# Patient Record
Sex: FEMALE | Race: WHITE
[De-identification: names, ages, dates, MRNs, and addresses within clinical notes are randomized per-mention and may not be internally consistent; named-entity substitution may affect disease eponyms.]

---

## 2022-03-11 ENCOUNTER — HOSPITAL ENCOUNTER (INPATIENT)
Dept: HOSPITAL 61 - ER | Age: 77
LOS: 2 days | Discharge: HOME | DRG: 291 | End: 2022-03-13
Attending: INTERNAL MEDICINE | Admitting: INTERNAL MEDICINE
Payer: MEDICARE

## 2022-03-11 VITALS — HEIGHT: 63 IN | BODY MASS INDEX: 47.66 KG/M2 | WEIGHT: 268.96 LBS

## 2022-03-11 DIAGNOSIS — I48.91: ICD-10-CM

## 2022-03-11 DIAGNOSIS — Z87.891: ICD-10-CM

## 2022-03-11 DIAGNOSIS — R79.89: ICD-10-CM

## 2022-03-11 DIAGNOSIS — E66.01: ICD-10-CM

## 2022-03-11 DIAGNOSIS — J44.9: ICD-10-CM

## 2022-03-11 DIAGNOSIS — E11.42: ICD-10-CM

## 2022-03-11 DIAGNOSIS — F32.A: ICD-10-CM

## 2022-03-11 DIAGNOSIS — Z90.710: ICD-10-CM

## 2022-03-11 DIAGNOSIS — I11.0: Primary | ICD-10-CM

## 2022-03-11 DIAGNOSIS — F41.9: ICD-10-CM

## 2022-03-11 DIAGNOSIS — U07.1: ICD-10-CM

## 2022-03-11 DIAGNOSIS — K21.9: ICD-10-CM

## 2022-03-11 DIAGNOSIS — Z96.659: ICD-10-CM

## 2022-03-11 DIAGNOSIS — M54.50: ICD-10-CM

## 2022-03-11 DIAGNOSIS — I50.31: ICD-10-CM

## 2022-03-11 DIAGNOSIS — Z82.49: ICD-10-CM

## 2022-03-11 DIAGNOSIS — Z20.822: ICD-10-CM

## 2022-03-11 DIAGNOSIS — I95.9: ICD-10-CM

## 2022-03-11 DIAGNOSIS — G89.29: ICD-10-CM

## 2022-03-11 DIAGNOSIS — Z90.49: ICD-10-CM

## 2022-03-11 DIAGNOSIS — E03.9: ICD-10-CM

## 2022-03-11 DIAGNOSIS — M19.90: ICD-10-CM

## 2022-03-11 LAB
ALBUMIN SERPL-MCNC: 3 G/DL (ref 3.4–5)
ALBUMIN/GLOB SERPL: 0.6 {RATIO} (ref 1–1.7)
ALP SERPL-CCNC: 76 U/L (ref 46–116)
ALT SERPL-CCNC: 17 U/L (ref 14–59)
ANION GAP SERPL CALC-SCNC: 9 MMOL/L (ref 6–14)
APTT PPP: YELLOW S
AST SERPL-CCNC: 12 U/L (ref 15–37)
BACTERIA #/AREA URNS HPF: (no result) /HPF
BASOPHILS # BLD AUTO: 0.1 X10^3/UL (ref 0–0.2)
BASOPHILS NFR BLD: 1 % (ref 0–3)
BILIRUB SERPL-MCNC: 0.4 MG/DL (ref 0.2–1)
BILIRUB UR QL STRIP: (no result)
BUN SERPL-MCNC: 15 MG/DL (ref 7–20)
BUN/CREAT SERPL: 13 (ref 6–20)
CALCIUM SERPL-MCNC: 9.5 MG/DL (ref 8.5–10.1)
CHLORIDE SERPL-SCNC: 102 MMOL/L (ref 98–107)
CO2 SERPL-SCNC: 28 MMOL/L (ref 21–32)
CREAT SERPL-MCNC: 1.2 MG/DL (ref 0.6–1)
D DIMER PPP FEU-MCNC: 0.57 UG/MLFEU (ref 0–0.5)
EOSINOPHIL NFR BLD: 0.2 X10^3/UL (ref 0–0.7)
EOSINOPHIL NFR BLD: 2 % (ref 0–3)
ERYTHROCYTE [DISTWIDTH] IN BLOOD BY AUTOMATED COUNT: 14.4 % (ref 11.5–14.5)
FIBRINOGEN PPP-MCNC: CLEAR MG/DL
GFR SERPLBLD BASED ON 1.73 SQ M-ARVRAT: 43.9 ML/MIN
GLUCOSE SERPL-MCNC: 105 MG/DL (ref 70–99)
HCT VFR BLD CALC: 40.2 % (ref 36–47)
HGB BLD-MCNC: 13 G/DL (ref 12–15.5)
HYALINE CASTS #/AREA URNS LPF: (no result) /HPF
INFLUENZA A PATIENT: NEGATIVE
INFLUENZA B PATIENT: NEGATIVE
LIPASE: 28 U/L (ref 73–393)
LYMPHOCYTES # BLD: 3.1 X10^3/UL (ref 1–4.8)
LYMPHOCYTES NFR BLD AUTO: 32 % (ref 24–48)
MAGNESIUM SERPL-MCNC: 1.8 MG/DL (ref 1.8–2.4)
MCH RBC QN AUTO: 30 PG (ref 25–35)
MCHC RBC AUTO-ENTMCNC: 32 G/DL (ref 31–37)
MCV RBC AUTO: 93 FL (ref 79–100)
MONO #: 0.8 X10^3/UL (ref 0–1.1)
MONOCYTES NFR BLD: 8 % (ref 0–9)
NEUT #: 5.4 X10^3/UL (ref 1.8–7.7)
NEUTROPHILS NFR BLD AUTO: 57 % (ref 31–73)
NITRITE UR QL STRIP: NEGATIVE
PH UR STRIP: 5.5 [PH]
PHOSPHATE SERPL-MCNC: 2.9 MG/DL (ref 2.6–4.7)
PLATELET # BLD AUTO: 348 X10^3/UL (ref 140–400)
POTASSIUM SERPL-SCNC: 3.9 MMOL/L (ref 3.5–5.1)
PROT SERPL-MCNC: 8.1 G/DL (ref 6.4–8.2)
PROT UR STRIP-MCNC: NEGATIVE MG/DL
PROTHROMBIN TIME: 13 SEC (ref 11.7–14)
RBC # BLD AUTO: 4.34 X10^6/UL (ref 3.5–5.4)
RBC #/AREA URNS HPF: 0 /HPF (ref 0–2)
SODIUM SERPL-SCNC: 139 MMOL/L (ref 136–145)
UROBILINOGEN UR-MCNC: 0.2 MG/DL
WBC # BLD AUTO: 9.6 X10^3/UL (ref 4–11)
WBC #/AREA URNS HPF: (no result) /HPF (ref 0–4)

## 2022-03-11 PROCEDURE — 96375 TX/PRO/DX INJ NEW DRUG ADDON: CPT

## 2022-03-11 PROCEDURE — 96361 HYDRATE IV INFUSION ADD-ON: CPT

## 2022-03-11 PROCEDURE — 93005 ELECTROCARDIOGRAM TRACING: CPT

## 2022-03-11 PROCEDURE — G0378 HOSPITAL OBSERVATION PER HR: HCPCS

## 2022-03-11 PROCEDURE — 76705 ECHO EXAM OF ABDOMEN: CPT

## 2022-03-11 PROCEDURE — 83605 ASSAY OF LACTIC ACID: CPT

## 2022-03-11 PROCEDURE — 36415 COLL VENOUS BLD VENIPUNCTURE: CPT

## 2022-03-11 PROCEDURE — 83690 ASSAY OF LIPASE: CPT

## 2022-03-11 PROCEDURE — 85379 FIBRIN DEGRADATION QUANT: CPT

## 2022-03-11 PROCEDURE — 93306 TTE W/DOPPLER COMPLETE: CPT

## 2022-03-11 PROCEDURE — 71275 CT ANGIOGRAPHY CHEST: CPT

## 2022-03-11 PROCEDURE — 83735 ASSAY OF MAGNESIUM: CPT

## 2022-03-11 PROCEDURE — 74177 CT ABD & PELVIS W/CONTRAST: CPT

## 2022-03-11 PROCEDURE — 96365 THER/PROPH/DIAG IV INF INIT: CPT

## 2022-03-11 PROCEDURE — 87040 BLOOD CULTURE FOR BACTERIA: CPT

## 2022-03-11 PROCEDURE — 82962 GLUCOSE BLOOD TEST: CPT

## 2022-03-11 PROCEDURE — 83880 ASSAY OF NATRIURETIC PEPTIDE: CPT

## 2022-03-11 PROCEDURE — 87428 SARSCOV & INF VIR A&B AG IA: CPT

## 2022-03-11 PROCEDURE — 85610 PROTHROMBIN TIME: CPT

## 2022-03-11 PROCEDURE — C8929 TTE W OR WO FOL WCON,DOPPLER: HCPCS

## 2022-03-11 PROCEDURE — 80048 BASIC METABOLIC PNL TOTAL CA: CPT

## 2022-03-11 PROCEDURE — 84484 ASSAY OF TROPONIN QUANT: CPT

## 2022-03-11 PROCEDURE — 81001 URINALYSIS AUTO W/SCOPE: CPT

## 2022-03-11 PROCEDURE — 85025 COMPLETE CBC W/AUTO DIFF WBC: CPT

## 2022-03-11 PROCEDURE — 80053 COMPREHEN METABOLIC PANEL: CPT

## 2022-03-11 PROCEDURE — U0003 INFECTIOUS AGENT DETECTION BY NUCLEIC ACID (DNA OR RNA); SEVERE ACUTE RESPIRATORY SYNDROME CORONAVIRUS 2 (SARS-COV-2) (CORONAVIRUS DISEASE [COVID-19]), AMPLIFIED PROBE TECHNIQUE, MAKING USE OF HIGH THROUGHPUT TECHNOLOGIES AS DESCRIBED BY CMS-2020-01-R: HCPCS

## 2022-03-11 PROCEDURE — 84100 ASSAY OF PHOSPHORUS: CPT

## 2022-03-11 NOTE — RAD
INDICATION: Reason: right chest pain and epigastric pain, OMNI 350 80 ML IV / Spl. Instructions:  / H
istory: .  



COMPARISON: None



TECHNIQUE:



Axial CT images obtained through the chest, abdomen and pelvis with contrast. 3-dimensional angiograp
hic images processed



One or more of the following individualized dose reduction techniques were utilized for this examinat
ion:  1. Automated exposure control;  2. Adjustment of the mA and/or kV according to patient size;  3
. Use of iterative reconstruction technique.



FINDINGS:



There is some pulmonary nodules measuring up to 6 mm within the left lung. Mild opacities at left gre
ater than right lung base with a portion appearing linear.

There is also some groundglass opacities. Right lung nodules are also seen including one anteriorly m
easuring 6 mm.

Coronary artery calcific atherosclerosis.

Severe atherosclerotic disease of the thoracic aorta.

There is dilatation throughout the esophagus with intraluminal content as well as regions of wall thi
ckening. There is a mass seen within the mediastinum abutting the esophagus and airway approximately 
39 x 45 mm but a portion of this measurement includes mediastinal structures. No embolus in the main,
 right main or left main pulmonary artery with peripheral vessels obscured by motion.



Atherosclerotic disease throughout the abdominal aorta.

Degenerative changes throughout the spine with multilevel central canal and neural foraminal stenosis
.

Multiple ribs with angulation bilaterally could be from fractures of unknown age.

Postcholecystectomy changes with prominence of the bile ducts which is a common finding postoperative
ly. Liver is mildly prominent in size.

Fatty atrophy of the pancreas.

There is a suspected small duodenal diverticulum.

Spleen not grossly enlarged.

Nonobstructive left renal stone measuring 15 mm without hydronephrosis. Urinary bladder is decompress
ed.

Scoliotic curvature of the spine.

Fat-containing umbilical hernia.

Colonic diverticulosis.

No dilated loops of bowel to suggest obstruction. Degenerative changes hips.



IMPRESSION:



*  Mass is identified within the mediastinum. Differential considerations would include esophageal or
igin neoplasm or lymphadenopathy from causes such as lung origin neoplasm. The esophagus is also dist
ended with intraluminal content and has some masslike wall thickening.



*  Bilateral lung nodules are identified and are indeterminate in nature and follow-up will be needed
 to ensure no growth.



*  Mild opacities at the lower lungs including linear and groundglass component. Could be from atelec
tasis but mild infiltrate is not excluded.



*  Atherosclerotic disease throughout the vasculature.



*  No evidence of bowel obstruction.



*  Angulations of the bilateral ribs which could be from rib fractures of unknown age.



*  Severe degenerative changes of the spine with multilevel central canal and neural foraminal stenos
is.



*  Dilation of the bile ducts with some enhancement seen at the distal common bile duct. Could be sec
ondary to a stricture within the area with distal common bile duct or ampullary mass also in differen
tial.



Electronically signed by: Alexander Sabillon MD (3/11/2022 10:32 PM) [id number]

## 2022-03-11 NOTE — EKG
[facility]

              [address] [place] [address]

Test Date:    2022               Test Time:    19:04:14

Pat Name:     LUCAS RITCHIE                Department:   

Patient ID:   [id number]           Room:          

Gender:       F                        Technician:   

:          1945               Requested By: ALIX MCALLISTER

Order Number: [id number]           Reading MD:   Dionisio Hernandez

                                 Measurements

Intervals                              Axis          

Rate:         135                      P:            

MO:                                    QRS:          -5

QRSD:         90                       T:            22

QT:           288                                    

QTc:          436                                    

                           Interpretive Statements

ATRIAL FIBRILLATION WITH RVR

Electronically Signed On 3- 18:31:45 CST by Dionisio Hernandez

## 2022-03-11 NOTE — ED.ADGEN
Past Medical History


Past Medical History:  COPD, Diabetes-Type II, GERD, Hypertension, Hypothyroid, 

Other


Additional Past Medical Histor:  chronic low back pain


Past Surgical History:  Cholecystectomy, Hysterectomy, Knee Replacement, Other


Additional Past Surgical Histo:  gastric sleeve, cataracts


Smoking Status:  Former Smoker


Alcohol Use:  None


Drug Use:  None





General Adult


EDM:


Chief Complaint:  CHEST PAIN





HPI:


HPI:





Patient is a 76  year old female coming in with right-sided chest pain.  Patient

states the pain is along her anterior rib cage below her right breast and 

travels around to the back.  Patient states that she had COVID (despite being 

fully vaccinated) about 1.5 months ago and has never fully recovered.  Patient 

states that she has been having diarrhea but over the past 2 weeks has been 

vomiting.  States vomit is nonbloody nonbilious.  Has not had any fevers or 

cough.  States there is been to mucus in the emesis.  Patient has a history of a

hiatal hernia repair and gastric sleeve surgery, she is also had a 

cholecystectomy.  Patient is complaining of epigastric pain as well.  States she

is worried she has had pneumonia because she has been hospitalized for it in the

past.  No recent sick contacts, recent travel, raw or undercooked food.  Patient

is also noted to be in A. fib with RVR, patient denies any history of irregular 

heartbeat, and does not take any blood thinners.  She denies any left-sided 

chest pain.  Patient states she has seen her primary care provider 1 week ago 

and 2 weeks ago.  States she has not been getting any better.





Review of Systems:


Review of Systems:


All other systems within normal limits except for as noted in the HPI





Current Medications:





Current Medications








 Medications


  (Trade)  Dose


 Ordered  Sig/Jes  Start Time


 Stop Time Status Last Admin


Dose Admin


 


 Info


  (CONTRAST GIVEN


 -- Rx MONITORING)  1 each  PRN DAILY  PRN  3/11/22 21:15


 3/13/22 21:14   





 


 Iohexol


  (Omnipaque 350


 Mg/ml)  80 ml  1X  ONCE  3/11/22 21:30


 3/11/22 21:31 DC 3/11/22 21:22


80 ML


 


 Ondansetron HCl


  (Zofran)  4 mg  1X  ONCE  3/11/22 19:30


 3/11/22 19:37 DC 3/11/22 19:58


4 MG


 


 Sodium Chloride  1,000 ml @ 


 1,000 mls/hr  1X  ONCE  3/11/22 19:30


 3/11/22 20:29 DC 3/11/22 19:59


1,000 MLS/HR











Allergies:


Allergies:





Allergies








Coded Allergies Type Severity Reaction Last Updated Verified


 


  No Known Drug Allergies    18 No











Physical Exam:


PE:


Constitutional: Well developed, well nourished, no acute distress, non-toxic 

appearance. []


HENT: Normocephalic, atraumatic, bilateral external ears normal,  nose normal. 

[]


Eyes: PERRLA, conjunctiva normal, no discharge. [] 


Neck: No rigidity, supple, no stridor. [] 


Cardiovascular: Regular rate and rhythm, brisk cap refill.  Tachycardic, 

irregularly irregular rhythm []


Lungs & Thorax: Non labored symmetric respirations, no tachypnea or respiratory 

distress.  Lungs clear []


Abdomen: Soft, nondistended, epigastric tenderness without guarding or rebound.


Skin: Warm, dry, no erythema, no rash. [] 


Back: Unremarkable


Extremities: No deformities, range of motion grossly intact, no lower extremity 

edema [] 


Neurologic: Alert and oriented X 3, no focal deficits noted. []


Psychologic: Affect normal, judgement normal, mood normal. []





Current Patient Data:


Labs:





                                Laboratory Tests








Test


 3/11/22


19:09 3/11/22


20:08 3/11/22


20:36


 


White Blood Count


 9.6 x10^3/uL


(4.0-11.0) 


 





 


Red Blood Count


 4.34 x10^6/uL


(3.50-5.40) 


 





 


Hemoglobin


 13.0 g/dL


(12.0-15.5) 


 





 


Hematocrit


 40.2 %


(36.0-47.0) 


 





 


Mean Corpuscular Volume


 93 fL ()


 


 





 


Mean Corpuscular Hemoglobin 30 pg (25-35)    


 


Mean Corpuscular Hemoglobin


Concent 32 g/dL


(31-37) 


 





 


Red Cell Distribution Width


 14.4 %


(11.5-14.5) 


 





 


Platelet Count


 348 x10^3/uL


(140-400) 


 





 


Neutrophils (%) (Auto) 57 % (31-73)    


 


Lymphocytes (%) (Auto) 32 % (24-48)    


 


Monocytes (%) (Auto) 8 % (0-9)    


 


Eosinophils (%) (Auto) 2 % (0-3)    


 


Basophils (%) (Auto) 1 % (0-3)    


 


Neutrophils # (Auto)


 5.4 x10^3/uL


(1.8-7.7) 


 





 


Lymphocytes # (Auto)


 3.1 x10^3/uL


(1.0-4.8) 


 





 


Monocytes # (Auto)


 0.8 x10^3/uL


(0.0-1.1) 


 





 


Eosinophils # (Auto)


 0.2 x10^3/uL


(0.0-0.7) 


 





 


Basophils # (Auto)


 0.1 x10^3/uL


(0.0-0.2) 


 





 


Prothrombin Time


 13.0 SEC


(11.7-14.0) 


 





 


Prothrombin Time INR 1.0 (0.8-1.1)    


 


D-Dimer (Jennifer)


 0.57 ug/mlFEU


(0.00-0.50)  H 


 





 


Sodium Level


 139 mmol/L


(136-145) 


 





 


Potassium Level


 3.9 mmol/L


(3.5-5.1) 


 





 


Chloride Level


 102 mmol/L


() 


 





 


Carbon Dioxide Level


 28 mmol/L


(21-32) 


 





 


Anion Gap 9 (6-14)    


 


Blood Urea Nitrogen


 15 mg/dL


(7-20) 


 





 


Creatinine


 1.2 mg/dL


(0.6-1.0)  H 


 





 


Estimated GFR


(Cockcroft-Gault) 43.9  


 


 





 


BUN/Creatinine Ratio 13 (6-20)    


 


Glucose Level


 105 mg/dL


(70-99)  H 


 





 


Lactic Acid Level


 1.1 mmol/L


(0.4-2.0) 


 





 


Calcium Level


 9.5 mg/dL


(8.5-10.1) 


 





 


Phosphorus Level


 2.9 mg/dL


(2.6-4.7) 


 





 


Magnesium Level


 1.8 mg/dL


(1.8-2.4) 


 





 


Total Bilirubin


 0.4 mg/dL


(0.2-1.0) 


 





 


Aspartate Amino Transferase


(AST) 12 U/L (15-37)


L 


 





 


Alanine Aminotransferase (ALT)


 17 U/L (14-59)


 


 





 


Alkaline Phosphatase


 76 U/L


() 


 





 


Troponin I High Sensitivity 6 ng/L (4-50)    


 


NT-Pro-B-Type Natriuretic


Peptide 2070 pg/mL


(0-124)  H 


 





 


Total Protein


 8.1 g/dL


(6.4-8.2) 


 





 


Albumin


 3.0 g/dL


(3.4-5.0)  L 


 





 


Albumin/Globulin Ratio


 0.6 (1.0-1.7)


L 


 





 


Lipase


 28 U/L


()  L 


 





 


Influenza Type A Antigen


 


 Negative


(NEGATIVE) 





 


Influenza Type B Antigen


 


 Negative


(NEGATIVE) 





 


SARS-CoV-2 Antigen (Rapid)


 


 Negative


(NEGATIVE) 





 


Urine Collection Type   Unknown  


 


Urine Color   Yellow  


 


Urine Clarity   Clear  


 


Urine pH


 


 


 5.5 (<5.0-8.0)





 


Urine Specific Gravity


 


 


 1.025


(1.000-1.030)


 


Urine Protein


 


 


 Negative mg/dL


(NEG-TRACE)


 


Urine Glucose (UA)


 


 


 Negative mg/dL


(NEG)


 


Urine Ketones (Stick)


 


 


 Trace mg/dL


(NEG)


 


Urine Blood


 


 


 Negative (NEG)





 


Urine Nitrite


 


 


 Negative (NEG)





 


Urine Bilirubin   Small (NEG)  


 


Urine Urobilinogen Dipstick


 


 


 0.2 mg/dL (0.2


mg/dL)


 


Urine Leukocyte Esterase


 


 


 Negative (NEG)





 


Urine RBC   0 /HPF (0-2)  


 


Urine WBC


 


 


 1-4 /HPF (0-4)





 


Urine Squamous Epithelial


Cells 


 


 Mod /LPF  





 


Urine Bacteria


 


 


 Few /HPF


(0-FEW)


 


Urine Hyaline Casts   Few /HPF  


 


Urine Mucus   Mod /LPF  





                                Laboratory Tests


3/11/22 19:09








                                Laboratory Tests


3/11/22 19:09








Vital Signs:





                                   Vital Signs








  Date Time  Temp Pulse Resp B/P (MAP) Pulse Ox O2 Delivery O2 Flow Rate FiO2


 


3/11/22 21:32  124 24 144/60 (88) 96 Room Air  


 


3/11/22 18:57 98.0       





 98.0       











EKG:


EKG:


Irregular regular rhythm, heart rate 135 bpm, slight lax left axis deviation, no

 ST elevation or depression.  When compared to EKG dated 18 she is in 

normal sinus rhythm at that time []





Heart Score:


C/O Chest Pain:  Yes


HEART Score for Chest Pain:  








HEART Score for Chest Pain Response (Comments) Value


 


History Slighlty/Non-Suspicious 0


 


ECG Nonspecific Repolarizatio 1


 


Age > 65 2


 


Risk Factors                            1 or 2 Risk Factors 1


 


Troponin < Normal Limit 0


 


Total  4








Risk Factors:


Risk Factors:  DM, Current or recent (<one month) smoker, HTN, HLP, family 

history of CAD, obesity.


Risk Scores:


Score 0 - 3:  2.5% MACE over next 6 weeks - Discharge Home


Score 4 - 6:  20.3% MACE over next 6 weeks - Admit for Clinical Observation


Score 7 - 10:  72.7% MACE over next 6 weeks - Early Invasive Strategies





Radiology/Procedures:


Radiology/Procedures:


[facility]


                    [address] [place] [address]


                                 [phone]


                                        


                                 IMAGING REPORT





                                     Signed





PATIENT: LUCAS RITCHIE      ACCOUNT: [id number]     MRN#: [id number]


: 1945           LOCATION: ER              AGE: 76


SEX: F                    EXAM DT: 22         ACCESSION#: [id number]


STATUS: REG ER            ORD. PHYSICIAN: ALIX MCALLISTER MD


REASON: right chest pain and epigastric pain, OMNI 350 80 ML IV


PROCEDURE: CT ANGIO CHEST W ABD PEL W/





INDICATION: Reason: right chest pain and epigastric pain, OMNI 350 80 ML IV / 

Spl. Instructions:  / History: .  





COMPARISON: None





TECHNIQUE:





Axial CT images obtained through the chest, abdomen and pelvis with contrast. 3-

dimensional angiographic images processed





One or more of the following individualized dose reduction techniques were 

utilized for this examination:  1. Automated exposure control;  2. Adjustment of

 the mA and/or kV according to patient size;  3. Use of iterative reconstruction

 technique.





FINDINGS:





There is some pulmonary nodules measuring up to 6 mm within the left lung. Mild 

opacities at left greater than right lung base with a portion appearing linear.


There is also some groundglass opacities. Right lung nodules are also seen 

including one anteriorly measuring 6 mm.


Coronary artery calcific atherosclerosis.


Severe atherosclerotic disease of the thoracic aorta.


There is dilatation throughout the esophagus with intraluminal content as well 

as regions of wall thickening. There is a mass seen within the mediastinum 

abutting the esophagus and airway approximately 39 x 45 mm but a portion of this

 measurement includes mediastinal structures. No embolus in the main, right main

 or left main pulmonary artery with peripheral vessels obscured by motion.





Atherosclerotic disease throughout the abdominal aorta.


Degenerative changes throughout the spine with multilevel central canal and 

neural foraminal stenosis.


Multiple ribs with angulation bilaterally could be from fractures of unknown 

age.


Postcholecystectomy changes with prominence of the bile ducts which is a common 

finding postoperatively. Liver is mildly prominent in size.


Fatty atrophy of the pancreas.


There is a suspected small duodenal diverticulum.


Spleen not grossly enlarged.


Nonobstructive left renal stone measuring 15 mm without hydronephrosis. Urinary 

bladder is decompressed.


Scoliotic curvature of the spine.


Fat-containing umbilical hernia.


Colonic diverticulosis.


No dilated loops of bowel to suggest obstruction. Degenerative changes hips.





IMPRESSION:





*  Mass is identified within the mediastinum. Differential considerations would 

include esophageal origin neoplasm or lymphadenopathy from causes such as lung 

origin neoplasm. The esophagus is also distended with intraluminal content and 

has some masslike wall thickening.





*  Bilateral lung nodules are identified and are indeterminate in nature and 

follow-up will be needed to ensure no growth.





*  Mild opacities at the lower lungs including linear and groundglass component.

 Could be from atelectasis but mild infiltrate is not excluded.





*  Atherosclerotic disease throughout the vasculature.





*  No evidence of bowel obstruction.





*  Angulations of the bilateral ribs which could be from rib fractures of 

unknown age.





*  Severe degenerative changes of the spine with multilevel central canal and 

neural foraminal stenosis.





*  Dilation of the bile ducts with some enhancement seen at the distal common 

bile duct. Could be secondary to a stricture within the area with distal common 

bile duct or ampullary mass also in differential.





Electronically signed by: Greyson Merritt MD (3/11/2022 10:32 PM) AbaxiaKTOP-

[id number]














DICTATED and SIGNED BY:     GREYSON MERRITT MD


DATE:     22 [id number]


[][facility]


                    [address] [place] [address]


                                 [phone]


                                        


                                 IMAGING REPORT





                                     Signed





PATIENT: LUCAS RITCHIE      ACCOUNT: [id number]     MRN#: [id number]


: 1945           LOCATION: [address] [place] [address]         AGE: 76


SEX: F                    EXAM DT: 22         ACCESSION#: [id number]


STATUS: ADM IN            ORD. PHYSICIAN: ALIX MCALLISTER MD


REASON: RUQ, biliary dilation


PROCEDURE: ABDOMEN LTD





US ABDOMEN LIMITED





History: Reason: RUQ, biliary dilation / Spl. Instructions:  / History: 





Comparison: CT 2022.





Technique: Transabdominal ultrasound images are obtained of the right upper 

quadrant.





Findings:


Liver is normal in echogenicity. Right hepatic lobe measures 13.7 cm.  Portal 

flow is hepatopedal.





Prior cholecystectomy.





Common bile duct not identified due to overlying structures.





Visualized pancreas not well seen due to overlying bowel gas.  





The right kidney measures 9.7 x 3.6 x 4.3 cm. No hydronephrosis.





Aorta and IVC not well seen due to overlying structures.





IMPRESSION: 


1.  Degraded evaluation. No definite acute abdominal pathology.


2.  Common bile is not identified. Prior cholecystectomy. If persistent clinical

 concern, MRI/MRCP can further evaluate.





Electronically signed by: Alirio Rachel DO (3/12/2022 12:43 AM) [facility]














DICTATED and SIGNED BY:     ALIRIO RACHEL DO


DATE:     22 [id number]





Course & Med Decision Making:


Course & Med Decision Making


Pertinent Labs and Imaging studies reviewed. (See chart for details)


Patient in new onset A. fib with RVR.  Will admit on diltiazem for cardiology 

evaluation.


[]





Dragon Disclaimer:


Dragon Disclaimer:


This electronic medical record was generated, in whole or in part, using a voice

 recognition dictation system.





Departure


Departure


Impression:  


   Primary Impression:  


   Nausea & vomiting


   Additional Impression:  


   New onset atrial fibrillation


Disposition:   ADMITTED AS INPATIENT


Admitting Physician:  HIMS


Condition:  STABLE


Referrals:  


MEL THAKKAR DO (PCP)





Problem Qualifiers











ALIX MCALLISTER MD            Mar 11, 2022 19:41

## 2022-03-12 VITALS — SYSTOLIC BLOOD PRESSURE: 119 MMHG | DIASTOLIC BLOOD PRESSURE: 76 MMHG

## 2022-03-12 VITALS — SYSTOLIC BLOOD PRESSURE: 124 MMHG | DIASTOLIC BLOOD PRESSURE: 66 MMHG

## 2022-03-12 VITALS — DIASTOLIC BLOOD PRESSURE: 50 MMHG | SYSTOLIC BLOOD PRESSURE: 96 MMHG

## 2022-03-12 VITALS — DIASTOLIC BLOOD PRESSURE: 58 MMHG | SYSTOLIC BLOOD PRESSURE: 117 MMHG

## 2022-03-12 VITALS — DIASTOLIC BLOOD PRESSURE: 67 MMHG | SYSTOLIC BLOOD PRESSURE: 79 MMHG

## 2022-03-12 VITALS — DIASTOLIC BLOOD PRESSURE: 90 MMHG | SYSTOLIC BLOOD PRESSURE: 101 MMHG

## 2022-03-12 VITALS — DIASTOLIC BLOOD PRESSURE: 56 MMHG | SYSTOLIC BLOOD PRESSURE: 97 MMHG

## 2022-03-12 VITALS — SYSTOLIC BLOOD PRESSURE: 91 MMHG | DIASTOLIC BLOOD PRESSURE: 62 MMHG

## 2022-03-12 VITALS — SYSTOLIC BLOOD PRESSURE: 104 MMHG | DIASTOLIC BLOOD PRESSURE: 67 MMHG

## 2022-03-12 LAB
ANION GAP SERPL CALC-SCNC: 8 MMOL/L (ref 6–14)
BASOPHILS # BLD AUTO: 0 X10^3/UL (ref 0–0.2)
BASOPHILS NFR BLD: 0 % (ref 0–3)
BUN SERPL-MCNC: 15 MG/DL (ref 7–20)
CALCIUM SERPL-MCNC: 8.7 MG/DL (ref 8.5–10.1)
CHLORIDE SERPL-SCNC: 105 MMOL/L (ref 98–107)
CO2 SERPL-SCNC: 28 MMOL/L (ref 21–32)
CREAT SERPL-MCNC: 1.1 MG/DL (ref 0.6–1)
EOSINOPHIL NFR BLD: 0.1 X10^3/UL (ref 0–0.7)
EOSINOPHIL NFR BLD: 2 % (ref 0–3)
ERYTHROCYTE [DISTWIDTH] IN BLOOD BY AUTOMATED COUNT: 14 % (ref 11.5–14.5)
GFR SERPLBLD BASED ON 1.73 SQ M-ARVRAT: 48.6 ML/MIN
GLUCOSE SERPL-MCNC: 101 MG/DL (ref 70–99)
HCT VFR BLD CALC: 35.4 % (ref 36–47)
HGB BLD-MCNC: 11.5 G/DL (ref 12–15.5)
LYMPHOCYTES # BLD: 2.3 X10^3/UL (ref 1–4.8)
LYMPHOCYTES NFR BLD AUTO: 30 % (ref 24–48)
MCH RBC QN AUTO: 31 PG (ref 25–35)
MCHC RBC AUTO-ENTMCNC: 33 G/DL (ref 31–37)
MCV RBC AUTO: 94 FL (ref 79–100)
MONO #: 0.8 X10^3/UL (ref 0–1.1)
MONOCYTES NFR BLD: 10 % (ref 0–9)
NEUT #: 4.4 X10^3/UL (ref 1.8–7.7)
NEUTROPHILS NFR BLD AUTO: 58 % (ref 31–73)
PLATELET # BLD AUTO: 279 X10^3/UL (ref 140–400)
POTASSIUM SERPL-SCNC: 3.6 MMOL/L (ref 3.5–5.1)
RBC # BLD AUTO: 3.78 X10^6/UL (ref 3.5–5.4)
SODIUM SERPL-SCNC: 141 MMOL/L (ref 136–145)
WBC # BLD AUTO: 7.7 X10^3/UL (ref 4–11)

## 2022-03-12 RX ADMIN — GABAPENTIN SCH MG: 300 CAPSULE ORAL at 20:29

## 2022-03-12 RX ADMIN — APIXABAN SCH MG: 5 TABLET, FILM COATED ORAL at 20:29

## 2022-03-12 RX ADMIN — BUPROPION HYDROCHLORIDE SCH MG: 150 TABLET, FILM COATED, EXTENDED RELEASE ORAL at 20:29

## 2022-03-12 RX ADMIN — LEVOTHYROXINE SODIUM SCH MCG: 100 TABLET ORAL at 10:11

## 2022-03-12 RX ADMIN — BUPROPION HYDROCHLORIDE SCH MG: 150 TABLET, FILM COATED, EXTENDED RELEASE ORAL at 10:16

## 2022-03-12 RX ADMIN — GABAPENTIN SCH MG: 300 CAPSULE ORAL at 10:16

## 2022-03-12 RX ADMIN — APIXABAN SCH MG: 5 TABLET, FILM COATED ORAL at 14:28

## 2022-03-12 NOTE — PDOC1
History and Physical


Date of Admission


Date of Admission


DATE: 3/12/22 


TIME: 12:17





Source


Source:  Chart review, Patient





History of Present Illness


History of Present Illness


 Ms Yoon is a 76  year old female admit from ER with acute right-sided chest 

pain. 


she thought pleurisy because the pain went ot her back,  no palpitations, soem 

shortnes of breath. She was nauseated and vomited last night


Pain 6/10, better thsi AM


She had recent COVID, was vaccinated and has been weak since, with occassional 

GI symtoms. 


 admti for  A. fib with RVR,  no prior history,  feels better on cardizem gtt 

this AM





Past Medical History


Past Medical History


peripheral neuropathy


Cardiovascular:  HTN


Pulmonary:  No pertinent hx


GI:  GERD


Musculoskeletal:   low back pain, Osteoarthritis


Rheumatologic:  No pertinent hx


Endocrine:  Diabetes





Current Problem List


Problem List


Problems


Medical Problems:


(1) Nausea & vomiting


Status: Acute  





(2) New onset atrial fibrillation


Status: Acute  











Current Medications


Current Medications





Current Medications


Sodium Chloride 1,000 ml @  1,000 mls/hr 1X  ONCE IV  Last administered on 

3/11/22at 19:59;  Start 3/11/22 at 19:30;  Stop 3/11/22 at 20:29;  Status DC


Ondansetron HCl (Zofran) 4 mg 1X  ONCE IVP  Last administered on 3/11/22at 

19:58;  Start 3/11/22 at 19:30;  Stop 3/11/22 at 19:37;  Status DC


Iohexol (Omnipaque 350 Mg/ml) 80 ml 1X  ONCE IV  Last administered on 3/11/22at 

21:22;  Start 3/11/22 at 21:30;  Stop 3/11/22 at 21:31;  Status DC


Info (CONTRAST GIVEN -- Rx MONITORING) 1 each PRN DAILY  PRN MC SEE COMMENTS;  

Start 3/11/22 at 21:15;  Stop 3/13/22 at 21:14


Diltiazem HCl (Cardizem Iv Push) 10 mg 1X  ONCE IVP  Last administered on 

3/11/22at 21:59;  Start 3/11/22 at 22:00;  Stop 3/11/22 at 22:01;  Status DC


Diltiazem HCl 125 mg/Sodium Chloride 125 ml @ 5 mls/hr 1X  ONCE IV ;  Start 3/1

1/22 at 22:00;  Stop 3/11/22 at 21:48;  Status DC


Diltiazem HCl 125 mg/Dextrose 125 ml @ 5 mls/hr 1X  ONCE IV  Last administered 

on 3/11/22at 22:00;  Start 3/11/22 at 22:00;  Stop 3/12/22 at 11:26;  Status DC


Ondansetron HCl (Zofran) 4 mg PRN Q8HRS  PRN IVP NAUSEA/VOMITING 1ST CHOICE;  

Start 3/11/22 at 22:15;  Stop 3/12/22 at 22:14


Fentanyl Citrate (Fentanyl 2ml Vial) 50 mcg PRN Q1HR  PRN IVP SEVERE PAIN 7-10; 

Start 3/11/22 at 22:15;  Stop 3/12/22 at 22:14


Acetaminophen (Tylenol) 650 mg PRN Q4HRS  PRN PO FEVER > 100.3'F;  Start 3/11/22

at 22:15;  Stop 3/12/22 at 22:14


Diltiazem HCl 125 mg/Dextrose 125 ml @ 5 mls/hr CONT PRN  PRN IV AFIB Last 

administered on 3/12/22at 04:26;  Start 3/12/22 at 04:30


Bupropion HCl (Wellbutrin Sr) 150 mg BID PO  Last administered on 3/12/22at 

10:16;  Start 3/12/22 at 11:00


Gabapentin (Neurontin) 300 mg TID PO  Last administered on 3/12/22at 10:16;  

Start 3/12/22 at 12:00


Acetaminophen/ Hydrocodone Bitart (Lortab 7.5/325) 1 tab PRN Q6HRS  PRN PO PAIN 

Last administered on 3/12/22at 12:11;  Start 3/12/22 at 10:15


Levothyroxine Sodium (Synthroid) 100 mcg DAILY06 PO ;  Start 3/12/22 at 11:00





Active Scripts


Active


Reported


Metformin Hcl 1,000 Mg Tablet 1,000 Mg PO BIDWMEALS


     Metformin 1000mg tab, takes 1/2 to 2 tabs daily


Calcium (Calcium Carbonate) 500 Mg Tab.chew 600 Mg PO DAILY


Larry (Ursodiol) 250 Mg Tablet 300 Mg PO DAILY


Levothyroxine Sodium 100 Mcg Tablet 1 Tab PO DAILY


Hydrocodone-Apap 7.5-325  ** (Hydrocodone Bit/Acetaminophen) 1 Tab Tablet 1 Tab 

PO PRN Q6HRS PRN


Gemtesa (Vibegron) 75 Mg Tablet 75 Mg PO DAILY


Phentermine Hcl 37.5 Mg Capsule 1 Cap PO DAILYWBKFT


Omeprazole 20 Mg Capsule. 1 Cap PO DAILY


Hydrochlorothiazide Tablet  ** (Hydrochlorothiazide) 25 Mg Tablet 1 Tab PO DAILY


Gabapentin ** (Gabapentin) 300 Mg Capsule 300 Mg PO TID


Daily Multiple Vitamin (Multivitamin) 1 Each Tablet 1 Each PO DAILY


Wellbutrin Sr (Bupropion Hcl) 150 Mg Tablet.er 1 Tab PO BID





Allergies


Allergies:  


Coded Allergies:  


     No Known Drug Allergies (Unverified , 1/16/18)





ROS


General:  No: Chills, Night Sweats, Fatigue, Malaise, Appetite, Other


PSYCHOLOGICAL ROS:  YES: Anxiety, Sleep disturbances; 


   No: Behavioral Disorder, Concentration difficultie, Decreased libido, 

Depression, Disorientation, Hallucinations, Hostility, Irritablity, Memory 

difficulties, Mood Swings, Obsessive thoughts, Other


Eyes:  No Blurry vision, No Decreased vision, No Double vision, No Dry eyes, No 

Excessive tearing, No Eye Pain, No Itchy Eyes, No Loss of vision, No P

hotophobia, No Scotomata, No Uses contacts, No Uses glasses, No Other


HEENT:  No: Heacaches, Visual Changes, Hearing change, Nasal congestion, Nasal 

discharge, Oral lesions, Sinus pain, Sore Throat, Epistaxis, Sneezing, Snoring, 

Tinnitus, Vertigo, Vocal changes, Other


Respiratory:  YES: Pleuritic Pain; 


   No: Cough, Hemoptysis, Orthopnea, Shortness of breath, SOB with excertion, 

Sputum Changes, Stridor, Tachypnea, Wheezing, Other


Cardiovascular:  yes Chest Pain; 


   No Palpitations, No Orthopnea, No Paroxysmal Noc. Dyspnea, No Edema, No Lt 

Headedness, No Other


Gastrointestinal:  Yes Nausea; 


   No Vomiting, No Abdominal Pain, No Diarrhea, No Constipation, No Melena, No 

Hematochezia, No Other


Genitourinary:  No Dysuria, No Frequency, No Incontinence, No Hematuria, No 

Retention, No Discharge, No Urgency, No Pain, No Flank Pain, No Other, No , No ,

No , No , No , No , No 


Musculoskeletal:  Yes Joint Pain, Yes Joint Stiffness


Neurological:  No Behavorial Changes, No Bowel/Bladder ControlChng, No 

Confusion, No Dizziness, No Gait Disturbance, No Headaches, No Impaired 

Coord/balance, No Memory Loss, No Numbness/Tingling, No Seizures, No Speech 

Problems, No Tremors, No Visual Changes, No Weakness, No Other


Skin:  No Dry Skin, No Eczema, No Hair Changes, No Lumps, No Mole Changes, No 

Mottling, No Nail Changes, No Pruritus, No Rash, No Skin Lesion Changes, No 

Other, No Acne





Physical Exam


General:  Alert, Oriented X3, Cooperative, mild distress


HEENT:  Atraumatic


Lungs:  Clear to auscultation


Heart:  S1S2, RRR


Abdomen:  Soft (obese)


Extremities:  No clubbing


Skin:  No significant lesion


Neuro:  Normal speech, Normal tone, Sensation intact


Psych/Mental Status:  Mental status NL, Mood NL





Vitals


Vitals





Vital Signs








  Date Time  Temp Pulse Resp B/P (MAP) Pulse Ox O2 Delivery O2 Flow Rate FiO2


 


3/12/22 11:00 98.1 80 18 97/56 (70)  Room Air  





 98.1       


 


3/12/22 07:00     94   


 


3/12/22 00:25       3.0 











Labs


Labs





Laboratory Tests








Test


 3/11/22


19:09 3/11/22


20:08 3/11/22


20:36 3/12/22


04:00


 


White Blood Count


 9.6 x10^3/uL


(4.0-11.0) 


 


 7.7 x10^3/uL


(4.0-11.0)


 


Red Blood Count


 4.34 x10^6/uL


(3.50-5.40) 


 


 3.78 x10^6/uL


(3.50-5.40)


 


Hemoglobin


 13.0 g/dL


(12.0-15.5) 


 


 11.5 g/dL


(12.0-15.5)


 


Hematocrit


 40.2 %


(36.0-47.0) 


 


 35.4 %


(36.0-47.0)


 


Mean Corpuscular Volume 93 fL ()    94 fL () 


 


Mean Corpuscular Hemoglobin 30 pg (25-35)    31 pg (25-35) 


 


Mean Corpuscular Hemoglobin


Concent 32 g/dL


(31-37) 


 


 33 g/dL


(31-37)


 


Red Cell Distribution Width


 14.4 %


(11.5-14.5) 


 


 14.0 %


(11.5-14.5)


 


Platelet Count


 348 x10^3/uL


(140-400) 


 


 279 x10^3/uL


(140-400)


 


Neutrophils (%) (Auto) 57 % (31-73)    58 % (31-73) 


 


Lymphocytes (%) (Auto) 32 % (24-48)    30 % (24-48) 


 


Monocytes (%) (Auto) 8 % (0-9)    10 % (0-9) 


 


Eosinophils (%) (Auto) 2 % (0-3)    2 % (0-3) 


 


Basophils (%) (Auto) 1 % (0-3)    0 % (0-3) 


 


Neutrophils # (Auto)


 5.4 x10^3/uL


(1.8-7.7) 


 


 4.4 x10^3/uL


(1.8-7.7)


 


Lymphocytes # (Auto)


 3.1 x10^3/uL


(1.0-4.8) 


 


 2.3 x10^3/uL


(1.0-4.8)


 


Monocytes # (Auto)


 0.8 x10^3/uL


(0.0-1.1) 


 


 0.8 x10^3/uL


(0.0-1.1)


 


Eosinophils # (Auto)


 0.2 x10^3/uL


(0.0-0.7) 


 


 0.1 x10^3/uL


(0.0-0.7)


 


Basophils # (Auto)


 0.1 x10^3/uL


(0.0-0.2) 


 


 0.0 x10^3/uL


(0.0-0.2)


 


Prothrombin Time


 13.0 SEC


(11.7-14.0) 


 


 





 


Prothromb Time International


Ratio 1.0 (0.8-1.1) 


 


 


 





 


D-Dimer (Jennifer)


 0.57 ug/mlFEU


(0.00-0.50) 


 


 





 


Sodium Level


 139 mmol/L


(136-145) 


 


 141 mmol/L


(136-145)


 


Potassium Level


 3.9 mmol/L


(3.5-5.1) 


 


 3.6 mmol/L


(3.5-5.1)


 


Chloride Level


 102 mmol/L


() 


 


 105 mmol/L


()


 


Carbon Dioxide Level


 28 mmol/L


(21-32) 


 


 28 mmol/L


(21-32)


 


Anion Gap 9 (6-14)    8 (6-14) 


 


Blood Urea Nitrogen


 15 mg/dL


(7-20) 


 


 15 mg/dL


(7-20)


 


Creatinine


 1.2 mg/dL


(0.6-1.0) 


 


 1.1 mg/dL


(0.6-1.0)


 


Estimated GFR


(Cockcroft-Gault) 43.9 


 


 


 48.6 





 


BUN/Creatinine Ratio 13 (6-20)    


 


Glucose Level


 105 mg/dL


(70-99) 


 


 101 mg/dL


(70-99)


 


Lactic Acid Level


 1.1 mmol/L


(0.4-2.0) 


 


 





 


Calcium Level


 9.5 mg/dL


(8.5-10.1) 


 


 8.7 mg/dL


(8.5-10.1)


 


Phosphorus Level


 2.9 mg/dL


(2.6-4.7) 


 


 





 


Magnesium Level


 1.8 mg/dL


(1.8-2.4) 


 


 





 


Total Bilirubin


 0.4 mg/dL


(0.2-1.0) 


 


 





 


Aspartate Amino Transf


(AST/SGOT) 12 U/L (15-37) 


 


 


 





 


Alanine Aminotransferase


(ALT/SGPT) 17 U/L (14-59) 


 


 


 





 


Alkaline Phosphatase


 76 U/L


() 


 


 





 


Troponin I High Sensitivity 6 ng/L (4-50)    


 


NT-Pro-B-Type Natriuretic


Peptide 2070 pg/mL


(0-124) 


 


 





 


Total Protein


 8.1 g/dL


(6.4-8.2) 


 


 





 


Albumin


 3.0 g/dL


(3.4-5.0) 


 


 





 


Albumin/Globulin Ratio 0.6 (1.0-1.7)    


 


Lipase


 28 U/L


() 


 


 





 


Influenza Type A Antigen


 


 Negative


(NEGATIVE) 


 





 


Influenza Type B Antigen


 


 Negative


(NEGATIVE) 


 





 


SARS-CoV-2 Antigen (Rapid)


 


 Negative


(NEGATIVE) 


 





 


Urine Collection Type   Unknown  


 


Urine Color   Yellow  


 


Urine Clarity   Clear  


 


Urine pH   5.5 (<5.0-8.0)  


 


Urine Specific Gravity


 


 


 1.025


(1.000-1.030) 





 


Urine Protein


 


 


 Negative mg/dL


(NEG-TRACE) 





 


Urine Glucose (UA)


 


 


 Negative mg/dL


(NEG) 





 


Urine Ketones (Stick)


 


 


 Trace mg/dL


(NEG) 





 


Urine Blood   Negative (NEG)  


 


Urine Nitrite   Negative (NEG)  


 


Urine Bilirubin   Small (NEG)  


 


Urine Urobilinogen Dipstick


 


 


 0.2 mg/dL (0.2


mg/dL) 





 


Urine Leukocyte Esterase   Negative (NEG)  


 


Urine RBC   0 /HPF (0-2)  


 


Urine WBC   1-4 /HPF (0-4)  


 


Urine Squamous Epithelial


Cells 


 


 Mod /LPF 


 





 


Urine Bacteria


 


 


 Few /HPF


(0-FEW) 





 


Urine Hyaline Casts   Few /HPF  


 


Urine Mucus   Mod /LPF  


 


Test


 3/12/22


07:48 3/12/22


11:27 


 





 


Glucose (Fingerstick)


 93 mg/dL


(70-99) 106 mg/dL


(70-99) 


 











Laboratory Tests








Test


 3/11/22


19:09 3/11/22


20:08 3/11/22


20:36 3/12/22


04:00


 


White Blood Count


 9.6 x10^3/uL


(4.0-11.0) 


 


 7.7 x10^3/uL


(4.0-11.0)


 


Red Blood Count


 4.34 x10^6/uL


(3.50-5.40) 


 


 3.78 x10^6/uL


(3.50-5.40)


 


Hemoglobin


 13.0 g/dL


(12.0-15.5) 


 


 11.5 g/dL


(12.0-15.5)


 


Hematocrit


 40.2 %


(36.0-47.0) 


 


 35.4 %


(36.0-47.0)


 


Mean Corpuscular Volume 93 fL ()    94 fL () 


 


Mean Corpuscular Hemoglobin 30 pg (25-35)    31 pg (25-35) 


 


Mean Corpuscular Hemoglobin


Concent 32 g/dL


(31-37) 


 


 33 g/dL


(31-37)


 


Red Cell Distribution Width


 14.4 %


(11.5-14.5) 


 


 14.0 %


(11.5-14.5)


 


Platelet Count


 348 x10^3/uL


(140-400) 


 


 279 x10^3/uL


(140-400)


 


Neutrophils (%) (Auto) 57 % (31-73)    58 % (31-73) 


 


Lymphocytes (%) (Auto) 32 % (24-48)    30 % (24-48) 


 


Monocytes (%) (Auto) 8 % (0-9)    10 % (0-9) 


 


Eosinophils (%) (Auto) 2 % (0-3)    2 % (0-3) 


 


Basophils (%) (Auto) 1 % (0-3)    0 % (0-3) 


 


Neutrophils # (Auto)


 5.4 x10^3/uL


(1.8-7.7) 


 


 4.4 x10^3/uL


(1.8-7.7)


 


Lymphocytes # (Auto)


 3.1 x10^3/uL


(1.0-4.8) 


 


 2.3 x10^3/uL


(1.0-4.8)


 


Monocytes # (Auto)


 0.8 x10^3/uL


(0.0-1.1) 


 


 0.8 x10^3/uL


(0.0-1.1)


 


Eosinophils # (Auto)


 0.2 x10^3/uL


(0.0-0.7) 


 


 0.1 x10^3/uL


(0.0-0.7)


 


Basophils # (Auto)


 0.1 x10^3/uL


(0.0-0.2) 


 


 0.0 x10^3/uL


(0.0-0.2)


 


Prothrombin Time


 13.0 SEC


(11.7-14.0) 


 


 





 


Prothromb Time International


Ratio 1.0 (0.8-1.1) 


 


 


 





 


D-Dimer (Jennifer)


 0.57 ug/mlFEU


(0.00-0.50) 


 


 





 


Sodium Level


 139 mmol/L


(136-145) 


 


 141 mmol/L


(136-145)


 


Potassium Level


 3.9 mmol/L


(3.5-5.1) 


 


 3.6 mmol/L


(3.5-5.1)


 


Chloride Level


 102 mmol/L


() 


 


 105 mmol/L


()


 


Carbon Dioxide Level


 28 mmol/L


(21-32) 


 


 28 mmol/L


(21-32)


 


Anion Gap 9 (6-14)    8 (6-14) 


 


Blood Urea Nitrogen


 15 mg/dL


(7-20) 


 


 15 mg/dL


(7-20)


 


Creatinine


 1.2 mg/dL


(0.6-1.0) 


 


 1.1 mg/dL


(0.6-1.0)


 


Estimated GFR


(Cockcroft-Gault) 43.9 


 


 


 48.6 





 


BUN/Creatinine Ratio 13 (6-20)    


 


Glucose Level


 105 mg/dL


(70-99) 


 


 101 mg/dL


(70-99)


 


Lactic Acid Level


 1.1 mmol/L


(0.4-2.0) 


 


 





 


Calcium Level


 9.5 mg/dL


(8.5-10.1) 


 


 8.7 mg/dL


(8.5-10.1)


 


Phosphorus Level


 2.9 mg/dL


(2.6-4.7) 


 


 





 


Magnesium Level


 1.8 mg/dL


(1.8-2.4) 


 


 





 


Total Bilirubin


 0.4 mg/dL


(0.2-1.0) 


 


 





 


Aspartate Amino Transf


(AST/SGOT) 12 U/L (15-37) 


 


 


 





 


Alanine Aminotransferase


(ALT/SGPT) 17 U/L (14-59) 


 


 


 





 


Alkaline Phosphatase


 76 U/L


() 


 


 





 


Troponin I High Sensitivity 6 ng/L (4-50)    


 


NT-Pro-B-Type Natriuretic


Peptide 2070 pg/mL


(0-124) 


 


 





 


Total Protein


 8.1 g/dL


(6.4-8.2) 


 


 





 


Albumin


 3.0 g/dL


(3.4-5.0) 


 


 





 


Albumin/Globulin Ratio 0.6 (1.0-1.7)    


 


Lipase


 28 U/L


() 


 


 





 


Influenza Type A Antigen


 


 Negative


(NEGATIVE) 


 





 


Influenza Type B Antigen


 


 Negative


(NEGATIVE) 


 





 


SARS-CoV-2 Antigen (Rapid)


 


 Negative


(NEGATIVE) 


 





 


Urine Collection Type   Unknown  


 


Urine Color   Yellow  


 


Urine Clarity   Clear  


 


Urine pH   5.5 (<5.0-8.0)  


 


Urine Specific Gravity


 


 


 1.025


(1.000-1.030) 





 


Urine Protein


 


 


 Negative mg/dL


(NEG-TRACE) 





 


Urine Glucose (UA)


 


 


 Negative mg/dL


(NEG) 





 


Urine Ketones (Stick)


 


 


 Trace mg/dL


(NEG) 





 


Urine Blood   Negative (NEG)  


 


Urine Nitrite   Negative (NEG)  


 


Urine Bilirubin   Small (NEG)  


 


Urine Urobilinogen Dipstick


 


 


 0.2 mg/dL (0.2


mg/dL) 





 


Urine Leukocyte Esterase   Negative (NEG)  


 


Urine RBC   0 /HPF (0-2)  


 


Urine WBC   1-4 /HPF (0-4)  


 


Urine Squamous Epithelial


Cells 


 


 Mod /LPF 


 





 


Urine Bacteria


 


 


 Few /HPF


(0-FEW) 





 


Urine Hyaline Casts   Few /HPF  


 


Urine Mucus   Mod /LPF  


 


Test


 3/12/22


07:48 3/12/22


11:27 


 





 


Glucose (Fingerstick)


 93 mg/dL


(70-99) 106 mg/dL


(70-99) 


 














VTE Prophylaxis Ordered


VTE Prophylaxis Devices:  No


VTE Pharmacological Prophylaxi:  Yes





Assessment/Plan


Assessment/Plan


acute diastolic CHF


atrial fib with RVR, cardizem gtt, transition to PO





morbid obesity, BMI 46


Dm2


htn, hold meds, hypotensive current


depression


anxiety, stable


peripheral neuropathy





admit tele, CV unit





Justifications for Admission


Other Justification














RORY HOLLAND MD                 Mar 12, 2022 12:22

## 2022-03-12 NOTE — PDOC2
CONSULT


Date of Consult


Date of Consult


DATE: 3/12/22 


TIME: 14:02





Reason for Consult


Reason for Consult:


Atrial fibrillation.





Referring Physician


Referring Physician:


Dr. Haney





Identification/Chief Complaint


Chief Complaint


The patient is a 76-year-old female who was admitted with episodes of nausea and

vomiting, mild right-sided chest pain and apparent new onset of atrial 

fibrillation.  Patient's work-up is included a CT scan of the chest that showed 

a mass in the mediastinum.  An abdominal ultrasound showed no acute changes.  

Lab testing includes a Lorraine a troponin of 6 and an elevated BNP at 2070.  

Patient's rate has improved overnight and is now 104.  She is also feeling 

mildly better.  She denies any history of coronary disease, congestive heart 

failure or cardiac arrhythmias.  She does have a history of hypertension, COPD 

and diabetes.  Her chief complaint this morning is her back pain which she 

states is a chronic problem.





Source


Source:  Chart review, Patient





History of Present Illness


Reason for Visit:


As above under chief complaint.





Past Medical History


Cardiovascular:  HTN


Pulmonary:  COPD


GI:  GERD


Musculoskeletal:   low back pain, Osteoarthritis


Rheumatologic:  No pertinent hx


Endocrine:  Diabetes





Past Surgical History


Past Surgical History:  Cholecystectomy, Cataract Removal, Total knee 

replacement, Hysterectomy





Family History


Family History:  Hypertension





Social History


Quit


ALCOHOL:  none





Current Problem List


Problem List


Problems


Medical Problems:


(1) Nausea & vomiting


Status: Acute  





(2) New onset atrial fibrillation


Status: Acute  











Current Medications


Current Medications





Current Medications


Sodium Chloride 1,000 ml @  1,000 mls/hr 1X  ONCE IV  Last administered on 

3/11/22at 19:59;  Start 3/11/22 at 19:30;  Stop 3/11/22 at 20:29;  Status DC


Ondansetron HCl (Zofran) 4 mg 1X  ONCE IVP  Last administered on 3/11/22at 

19:58;  Start 3/11/22 at 19:30;  Stop 3/11/22 at 19:37;  Status DC


Iohexol (Omnipaque 350 Mg/ml) 80 ml 1X  ONCE IV  Last administered on 3/11/22at 

21:22;  Start 3/11/22 at 21:30;  Stop 3/11/22 at 21:31;  Status DC


Info (CONTRAST GIVEN -- Rx MONITORING) 1 each PRN DAILY  PRN MC SEE COMMENTS;  

Start 3/11/22 at 21:15;  Stop 3/13/22 at 21:14


Diltiazem HCl (Cardizem Iv Push) 10 mg 1X  ONCE IVP  Last administered on 3/11/2

2at 21:59;  Start 3/11/22 at 22:00;  Stop 3/11/22 at 22:01;  Status DC


Diltiazem HCl 125 mg/Sodium Chloride 125 ml @ 5 mls/hr 1X  ONCE IV ;  Start 

3/11/22 at 22:00;  Stop 3/11/22 at 21:48;  Status DC


Diltiazem HCl 125 mg/Dextrose 125 ml @ 5 mls/hr 1X  ONCE IV  Last administered 

on 3/11/22at 22:00;  Start 3/11/22 at 22:00;  Stop 3/12/22 at 11:26;  Status DC


Ondansetron HCl (Zofran) 4 mg PRN Q8HRS  PRN IVP NAUSEA/VOMITING 1ST CHOICE;  

Start 3/11/22 at 22:15;  Stop 3/12/22 at 22:14


Fentanyl Citrate (Fentanyl 2ml Vial) 50 mcg PRN Q1HR  PRN IVP SEVERE PAIN 7-10; 

Start 3/11/22 at 22:15;  Stop 3/12/22 at 22:14


Acetaminophen (Tylenol) 650 mg PRN Q4HRS  PRN PO FEVER > 100.3'F;  Start 3/11/22

at 22:15;  Stop 3/12/22 at 22:14


Diltiazem HCl 125 mg/Dextrose 125 ml @ 5 mls/hr CONT PRN  PRN IV AFIB Last 

administered on 3/12/22at 04:26;  Start 3/12/22 at 04:30;  Stop 3/12/22 at 

12:19;  Status DC


Bupropion HCl (Wellbutrin Sr) 150 mg BID PO  Last administered on 3/12/22at 

10:16;  Start 3/12/22 at 11:00


Gabapentin (Neurontin) 300 mg TID PO  Last administered on 3/12/22at 10:16;  

Start 3/12/22 at 12:00


Acetaminophen/ Hydrocodone Bitart (Lortab 7.5/325) 1 tab PRN Q6HRS  PRN PO PAIN 

Last administered on 3/12/22at 12:11;  Start 3/12/22 at 10:15


Levothyroxine Sodium (Synthroid) 100 mcg DAILY06 PO ;  Start 3/12/22 at 11:00


Diltiazem HCl (Cardizem 24hr ) 240 mg DAILY PO  Last administered on 3/12/22at

12:40;  Start 3/12/22 at 12:30





Active Scripts


Active


Reported


Metformin Hcl 1,000 Mg Tablet 1,000 Mg PO BIDWMEALS


     Metformin 1000mg tab, takes 1/2 to 2 tabs daily


Calcium (Calcium Carbonate) 500 Mg Tab.chew 600 Mg PO DAILY


Larry (Ursodiol) 250 Mg Tablet 300 Mg PO DAILY


Levothyroxine Sodium 100 Mcg Tablet 1 Tab PO DAILY


Hydrocodone-Apap 7.5-325  ** (Hydrocodone Bit/Acetaminophen) 1 Tab Tablet 1 Tab 

PO PRN Q6HRS PRN


Gemtesa (Vibegron) 75 Mg Tablet 75 Mg PO DAILY


Phentermine Hcl 37.5 Mg Capsule 1 Cap PO DAILYWBKFT


Omeprazole 20 Mg Capsule.dr 1 Cap PO DAILY


Hydrochlorothiazide Tablet  ** (Hydrochlorothiazide) 25 Mg Tablet 1 Tab PO DAILY


Gabapentin ** (Gabapentin) 300 Mg Capsule 300 Mg PO TID


Daily Multiple Vitamin (Multivitamin) 1 Each Tablet 1 Each PO DAILY


Wellbutrin Sr (Bupropion Hcl) 150 Mg Tablet.er 1 Tab PO BID





Allergies


Allergies:  


Coded Allergies:  


     No Known Drug Allergies (Unverified , 1/16/18)





ROS


Respiratory:  YES: Shortness of breath


Cardiovascular:  yes Chest Pain


Gastrointestinal:  Yes Nausea, Yes Abdominal Pain





Physical Exam


General:  mild distress


HEENT:  Atraumatic


Lungs:  Other (Slightly decreased breath sounds)


Heart:  Other (Irregularly irregular)


Abdomen:  Normal bowel sounds





Vitals


VITALS





Vital Signs








  Date Time  Temp Pulse Resp B/P (MAP) Pulse Ox O2 Delivery O2 Flow Rate FiO2


 


3/12/22 12:40  80  97/56    


 


3/12/22 11:00 98.1  18   Room Air  





 98.1       


 


3/12/22 07:00     94   


 


3/12/22 00:25       3.0 











Labs


Labs





Laboratory Tests








Test


 3/11/22


19:09 3/11/22


20:08 3/11/22


20:36 3/12/22


04:00


 


White Blood Count


 9.6 x10^3/uL


(4.0-11.0) 


 


 7.7 x10^3/uL


(4.0-11.0)


 


Red Blood Count


 4.34 x10^6/uL


(3.50-5.40) 


 


 3.78 x10^6/uL


(3.50-5.40)


 


Hemoglobin


 13.0 g/dL


(12.0-15.5) 


 


 11.5 g/dL


(12.0-15.5)


 


Hematocrit


 40.2 %


(36.0-47.0) 


 


 35.4 %


(36.0-47.0)


 


Mean Corpuscular Volume 93 fL ()    94 fL () 


 


Mean Corpuscular Hemoglobin 30 pg (25-35)    31 pg (25-35) 


 


Mean Corpuscular Hemoglobin


Concent 32 g/dL


(31-37) 


 


 33 g/dL


(31-37)


 


Red Cell Distribution Width


 14.4 %


(11.5-14.5) 


 


 14.0 %


(11.5-14.5)


 


Platelet Count


 348 x10^3/uL


(140-400) 


 


 279 x10^3/uL


(140-400)


 


Neutrophils (%) (Auto) 57 % (31-73)    58 % (31-73) 


 


Lymphocytes (%) (Auto) 32 % (24-48)    30 % (24-48) 


 


Monocytes (%) (Auto) 8 % (0-9)    10 % (0-9) 


 


Eosinophils (%) (Auto) 2 % (0-3)    2 % (0-3) 


 


Basophils (%) (Auto) 1 % (0-3)    0 % (0-3) 


 


Neutrophils # (Auto)


 5.4 x10^3/uL


(1.8-7.7) 


 


 4.4 x10^3/uL


(1.8-7.7)


 


Lymphocytes # (Auto)


 3.1 x10^3/uL


(1.0-4.8) 


 


 2.3 x10^3/uL


(1.0-4.8)


 


Monocytes # (Auto)


 0.8 x10^3/uL


(0.0-1.1) 


 


 0.8 x10^3/uL


(0.0-1.1)


 


Eosinophils # (Auto)


 0.2 x10^3/uL


(0.0-0.7) 


 


 0.1 x10^3/uL


(0.0-0.7)


 


Basophils # (Auto)


 0.1 x10^3/uL


(0.0-0.2) 


 


 0.0 x10^3/uL


(0.0-0.2)


 


Prothrombin Time


 13.0 SEC


(11.7-14.0) 


 


 





 


Prothromb Time International


Ratio 1.0 (0.8-1.1) 


 


 


 





 


D-Dimer (Jennifer)


 0.57 ug/mlFEU


(0.00-0.50) 


 


 





 


Sodium Level


 139 mmol/L


(136-145) 


 


 141 mmol/L


(136-145)


 


Potassium Level


 3.9 mmol/L


(3.5-5.1) 


 


 3.6 mmol/L


(3.5-5.1)


 


Chloride Level


 102 mmol/L


() 


 


 105 mmol/L


()


 


Carbon Dioxide Level


 28 mmol/L


(21-32) 


 


 28 mmol/L


(21-32)


 


Anion Gap 9 (6-14)    8 (6-14) 


 


Blood Urea Nitrogen


 15 mg/dL


(7-20) 


 


 15 mg/dL


(7-20)


 


Creatinine


 1.2 mg/dL


(0.6-1.0) 


 


 1.1 mg/dL


(0.6-1.0)


 


Estimated GFR


(Cockcroft-Gault) 43.9 


 


 


 48.6 





 


BUN/Creatinine Ratio 13 (6-20)    


 


Glucose Level


 105 mg/dL


(70-99) 


 


 101 mg/dL


(70-99)


 


Lactic Acid Level


 1.1 mmol/L


(0.4-2.0) 


 


 





 


Calcium Level


 9.5 mg/dL


(8.5-10.1) 


 


 8.7 mg/dL


(8.5-10.1)


 


Phosphorus Level


 2.9 mg/dL


(2.6-4.7) 


 


 





 


Magnesium Level


 1.8 mg/dL


(1.8-2.4) 


 


 





 


Total Bilirubin


 0.4 mg/dL


(0.2-1.0) 


 


 





 


Aspartate Amino Transf


(AST/SGOT) 12 U/L (15-37) 


 


 


 





 


Alanine Aminotransferase


(ALT/SGPT) 17 U/L (14-59) 


 


 


 





 


Alkaline Phosphatase


 76 U/L


() 


 


 





 


Troponin I High Sensitivity 6 ng/L (4-50)    


 


NT-Pro-B-Type Natriuretic


Peptide 2070 pg/mL


(0-124) 


 


 





 


Total Protein


 8.1 g/dL


(6.4-8.2) 


 


 





 


Albumin


 3.0 g/dL


(3.4-5.0) 


 


 





 


Albumin/Globulin Ratio 0.6 (1.0-1.7)    


 


Lipase


 28 U/L


() 


 


 





 


Influenza Type A Antigen


 


 Negative


(NEGATIVE) 


 





 


Influenza Type B Antigen


 


 Negative


(NEGATIVE) 


 





 


SARS-CoV-2 Antigen (Rapid)


 


 Negative


(NEGATIVE) 


 





 


Urine Collection Type   Unknown  


 


Urine Color   Yellow  


 


Urine Clarity   Clear  


 


Urine pH   5.5 (<5.0-8.0)  


 


Urine Specific Gravity


 


 


 1.025


(1.000-1.030) 





 


Urine Protein


 


 


 Negative mg/dL


(NEG-TRACE) 





 


Urine Glucose (UA)


 


 


 Negative mg/dL


(NEG) 





 


Urine Ketones (Stick)


 


 


 Trace mg/dL


(NEG) 





 


Urine Blood   Negative (NEG)  


 


Urine Nitrite   Negative (NEG)  


 


Urine Bilirubin   Small (NEG)  


 


Urine Urobilinogen Dipstick


 


 


 0.2 mg/dL (0.2


mg/dL) 





 


Urine Leukocyte Esterase   Negative (NEG)  


 


Urine RBC   0 /HPF (0-2)  


 


Urine WBC   1-4 /HPF (0-4)  


 


Urine Squamous Epithelial


Cells 


 


 Mod /LPF 


 





 


Urine Bacteria


 


 


 Few /HPF


(0-FEW) 





 


Urine Hyaline Casts   Few /HPF  


 


Urine Mucus   Mod /LPF  


 


Test


 3/12/22


07:48 3/12/22


11:27 


 





 


Glucose (Fingerstick)


 93 mg/dL


(70-99) 106 mg/dL


(70-99) 


 











Laboratory Tests








Test


 3/11/22


19:09 3/11/22


20:08 3/11/22


20:36 3/12/22


04:00


 


White Blood Count


 9.6 x10^3/uL


(4.0-11.0) 


 


 7.7 x10^3/uL


(4.0-11.0)


 


Red Blood Count


 4.34 x10^6/uL


(3.50-5.40) 


 


 3.78 x10^6/uL


(3.50-5.40)


 


Hemoglobin


 13.0 g/dL


(12.0-15.5) 


 


 11.5 g/dL


(12.0-15.5)


 


Hematocrit


 40.2 %


(36.0-47.0) 


 


 35.4 %


(36.0-47.0)


 


Mean Corpuscular Volume 93 fL ()    94 fL () 


 


Mean Corpuscular Hemoglobin 30 pg (25-35)    31 pg (25-35) 


 


Mean Corpuscular Hemoglobin


Concent 32 g/dL


(31-37) 


 


 33 g/dL


(31-37)


 


Red Cell Distribution Width


 14.4 %


(11.5-14.5) 


 


 14.0 %


(11.5-14.5)


 


Platelet Count


 348 x10^3/uL


(140-400) 


 


 279 x10^3/uL


(140-400)


 


Neutrophils (%) (Auto) 57 % (31-73)    58 % (31-73) 


 


Lymphocytes (%) (Auto) 32 % (24-48)    30 % (24-48) 


 


Monocytes (%) (Auto) 8 % (0-9)    10 % (0-9) 


 


Eosinophils (%) (Auto) 2 % (0-3)    2 % (0-3) 


 


Basophils (%) (Auto) 1 % (0-3)    0 % (0-3) 


 


Neutrophils # (Auto)


 5.4 x10^3/uL


(1.8-7.7) 


 


 4.4 x10^3/uL


(1.8-7.7)


 


Lymphocytes # (Auto)


 3.1 x10^3/uL


(1.0-4.8) 


 


 2.3 x10^3/uL


(1.0-4.8)


 


Monocytes # (Auto)


 0.8 x10^3/uL


(0.0-1.1) 


 


 0.8 x10^3/uL


(0.0-1.1)


 


Eosinophils # (Auto)


 0.2 x10^3/uL


(0.0-0.7) 


 


 0.1 x10^3/uL


(0.0-0.7)


 


Basophils # (Auto)


 0.1 x10^3/uL


(0.0-0.2) 


 


 0.0 x10^3/uL


(0.0-0.2)


 


Prothrombin Time


 13.0 SEC


(11.7-14.0) 


 


 





 


Prothromb Time International


Ratio 1.0 (0.8-1.1) 


 


 


 





 


D-Dimer (Jennifer)


 0.57 ug/mlFEU


(0.00-0.50) 


 


 





 


Sodium Level


 139 mmol/L


(136-145) 


 


 141 mmol/L


(136-145)


 


Potassium Level


 3.9 mmol/L


(3.5-5.1) 


 


 3.6 mmol/L


(3.5-5.1)


 


Chloride Level


 102 mmol/L


() 


 


 105 mmol/L


()


 


Carbon Dioxide Level


 28 mmol/L


(21-32) 


 


 28 mmol/L


(21-32)


 


Anion Gap 9 (6-14)    8 (6-14) 


 


Blood Urea Nitrogen


 15 mg/dL


(7-20) 


 


 15 mg/dL


(7-20)


 


Creatinine


 1.2 mg/dL


(0.6-1.0) 


 


 1.1 mg/dL


(0.6-1.0)


 


Estimated GFR


(Cockcroft-Gault) 43.9 


 


 


 48.6 





 


BUN/Creatinine Ratio 13 (6-20)    


 


Glucose Level


 105 mg/dL


(70-99) 


 


 101 mg/dL


(70-99)


 


Lactic Acid Level


 1.1 mmol/L


(0.4-2.0) 


 


 





 


Calcium Level


 9.5 mg/dL


(8.5-10.1) 


 


 8.7 mg/dL


(8.5-10.1)


 


Phosphorus Level


 2.9 mg/dL


(2.6-4.7) 


 


 





 


Magnesium Level


 1.8 mg/dL


(1.8-2.4) 


 


 





 


Total Bilirubin


 0.4 mg/dL


(0.2-1.0) 


 


 





 


Aspartate Amino Transf


(AST/SGOT) 12 U/L (15-37) 


 


 


 





 


Alanine Aminotransferase


(ALT/SGPT) 17 U/L (14-59) 


 


 


 





 


Alkaline Phosphatase


 76 U/L


() 


 


 





 


Troponin I High Sensitivity 6 ng/L (4-50)    


 


NT-Pro-B-Type Natriuretic


Peptide 2070 pg/mL


(0-124) 


 


 





 


Total Protein


 8.1 g/dL


(6.4-8.2) 


 


 





 


Albumin


 3.0 g/dL


(3.4-5.0) 


 


 





 


Albumin/Globulin Ratio 0.6 (1.0-1.7)    


 


Lipase


 28 U/L


() 


 


 





 


Influenza Type A Antigen


 


 Negative


(NEGATIVE) 


 





 


Influenza Type B Antigen


 


 Negative


(NEGATIVE) 


 





 


SARS-CoV-2 Antigen (Rapid)


 


 Negative


(NEGATIVE) 


 





 


Urine Collection Type   Unknown  


 


Urine Color   Yellow  


 


Urine Clarity   Clear  


 


Urine pH   5.5 (<5.0-8.0)  


 


Urine Specific Gravity


 


 


 1.025


(1.000-1.030) 





 


Urine Protein


 


 


 Negative mg/dL


(NEG-TRACE) 





 


Urine Glucose (UA)


 


 


 Negative mg/dL


(NEG) 





 


Urine Ketones (Stick)


 


 


 Trace mg/dL


(NEG) 





 


Urine Blood   Negative (NEG)  


 


Urine Nitrite   Negative (NEG)  


 


Urine Bilirubin   Small (NEG)  


 


Urine Urobilinogen Dipstick


 


 


 0.2 mg/dL (0.2


mg/dL) 





 


Urine Leukocyte Esterase   Negative (NEG)  


 


Urine RBC   0 /HPF (0-2)  


 


Urine WBC   1-4 /HPF (0-4)  


 


Urine Squamous Epithelial


Cells 


 


 Mod /LPF 


 





 


Urine Bacteria


 


 


 Few /HPF


(0-FEW) 





 


Urine Hyaline Casts   Few /HPF  


 


Urine Mucus   Mod /LPF  


 


Test


 3/12/22


07:48 3/12/22


11:27 


 





 


Glucose (Fingerstick)


 93 mg/dL


(70-99) 106 mg/dL


(70-99) 


 














Images


Images


Imaging as above.





Assessment/Plan


Assessment/Plan


1.  Atrial fibrillation.  Apparently new onset.  Rate is controlled on her 

present medications.  We will continue present medications and increase activity

as tolerated.  We will check an echocardiogram.  Will start anticoagulation.





2.  Nausea and vomiting.  Significantly improved.  Testing as above.





3.  Possible diastolic heart failure.  Continue present treatments.  

Echocardiogram as above.





4.  History of hypertension.  We will continue to monitor.





5.  Diabetes mellitus.  As per the primary service.











HARPER TORRES MD            Mar 12, 2022 14:08

## 2022-03-12 NOTE — RAD
US ABDOMEN LIMITED



History: Reason: RUQ, biliary dilation / Spl. Instructions:  / History: 



Comparison: CT March 11, 2022.



Technique: Transabdominal ultrasound images are obtained of the right upper quadrant.



Findings:

Liver is normal in echogenicity. Right hepatic lobe measures 13.7 cm.  Portal flow is hepatopedal.



Prior cholecystectomy.



Common bile duct not identified due to overlying structures.



Visualized pancreas not well seen due to overlying bowel gas.  



The right kidney measures 9.7 x 3.6 x 4.3 cm. No hydronephrosis.



Aorta and IVC not well seen due to overlying structures.



IMPRESSION: 

1.  Degraded evaluation. No definite acute abdominal pathology.

2.  Common bile is not identified. Prior cholecystectomy. If persistent clinical concern, MRI/MRCP ca
n further evaluate.



Electronically signed by: Alirio Rachel DO (3/12/2022 12:43 AM) [facility]

## 2022-03-13 VITALS — DIASTOLIC BLOOD PRESSURE: 63 MMHG | SYSTOLIC BLOOD PRESSURE: 122 MMHG

## 2022-03-13 VITALS
SYSTOLIC BLOOD PRESSURE: 105 MMHG | DIASTOLIC BLOOD PRESSURE: 48 MMHG | DIASTOLIC BLOOD PRESSURE: 48 MMHG | SYSTOLIC BLOOD PRESSURE: 105 MMHG

## 2022-03-13 VITALS — SYSTOLIC BLOOD PRESSURE: 103 MMHG | DIASTOLIC BLOOD PRESSURE: 57 MMHG

## 2022-03-13 RX ADMIN — BUPROPION HYDROCHLORIDE SCH MG: 150 TABLET, FILM COATED, EXTENDED RELEASE ORAL at 08:22

## 2022-03-13 RX ADMIN — GABAPENTIN SCH MG: 300 CAPSULE ORAL at 08:24

## 2022-03-13 RX ADMIN — APIXABAN SCH MG: 5 TABLET, FILM COATED ORAL at 08:24

## 2022-03-13 RX ADMIN — LEVOTHYROXINE SODIUM SCH MCG: 100 TABLET ORAL at 05:46

## 2022-03-13 RX ADMIN — GABAPENTIN SCH MG: 300 CAPSULE ORAL at 13:31

## 2022-03-13 NOTE — PDOC3
Discharge Summary


Visit Information


Date of Admission:  Mar 11, 2022


Date of Discharge:  Mar 13, 2022


Final Diagnosis


chest pain, acute diastolic CHF


atrial fib with RVR, cardizem gtt, 


morbid obesity, BMI 47


Dm2


htn, 


depression


anxiety, stable


peripheral neuropathy


 COVID 19 test postive, PCR pos on this hospital stay





Problems


Medical Problems:


(1) Nausea & vomiting


Status: Acute  





(2) New onset atrial fibrillation


Status: Acute  








Brief Hospital Course


Allergies





                                    Allergies








Coded Allergies Type Severity Reaction Last Updated Verified


 


  No Known Drug Allergies    1/16/18 No








Vital Signs





Vital Signs








  Date Time  Temp Pulse Resp B/P (MAP) Pulse Ox O2 Delivery O2 Flow Rate FiO2


 


3/13/22 11:00  95 18 105/48 (67) 99 Room Air  


 


3/13/22 08:00       3.0 


 


3/13/22 07:00 98.0       





 98.0       








Lab Results





Laboratory Tests








Test


 3/11/22


19:09 3/11/22


20:08 3/11/22


20:36 3/12/22


04:00


 


White Blood Count


 9.6 x10^3/uL


(4.0-11.0) 


 


 7.7 x10^3/uL


(4.0-11.0)


 


Red Blood Count


 4.34 x10^6/uL


(3.50-5.40) 


 


 3.78 x10^6/uL


(3.50-5.40)


 


Hemoglobin


 13.0 g/dL


(12.0-15.5) 


 


 11.5 g/dL


(12.0-15.5)


 


Hematocrit


 40.2 %


(36.0-47.0) 


 


 35.4 %


(36.0-47.0)


 


Mean Corpuscular Volume 93 fL ()    94 fL () 


 


Mean Corpuscular Hemoglobin 30 pg (25-35)    31 pg (25-35) 


 


Mean Corpuscular Hemoglobin


Concent 32 g/dL


(31-37) 


 


 33 g/dL


(31-37)


 


Red Cell Distribution Width


 14.4 %


(11.5-14.5) 


 


 14.0 %


(11.5-14.5)


 


Platelet Count


 348 x10^3/uL


(140-400) 


 


 279 x10^3/uL


(140-400)


 


Neutrophils (%) (Auto) 57 % (31-73)    58 % (31-73) 


 


Lymphocytes (%) (Auto) 32 % (24-48)    30 % (24-48) 


 


Monocytes (%) (Auto) 8 % (0-9)    10 % (0-9) 


 


Eosinophils (%) (Auto) 2 % (0-3)    2 % (0-3) 


 


Basophils (%) (Auto) 1 % (0-3)    0 % (0-3) 


 


Neutrophils # (Auto)


 5.4 x10^3/uL


(1.8-7.7) 


 


 4.4 x10^3/uL


(1.8-7.7)


 


Lymphocytes # (Auto)


 3.1 x10^3/uL


(1.0-4.8) 


 


 2.3 x10^3/uL


(1.0-4.8)


 


Monocytes # (Auto)


 0.8 x10^3/uL


(0.0-1.1) 


 


 0.8 x10^3/uL


(0.0-1.1)


 


Eosinophils # (Auto)


 0.2 x10^3/uL


(0.0-0.7) 


 


 0.1 x10^3/uL


(0.0-0.7)


 


Basophils # (Auto)


 0.1 x10^3/uL


(0.0-0.2) 


 


 0.0 x10^3/uL


(0.0-0.2)


 


Prothrombin Time


 13.0 SEC


(11.7-14.0) 


 


 





 


Prothromb Time International


Ratio 1.0 (0.8-1.1) 


 


 


 





 


D-Dimer (Jennifer)


 0.57 ug/mlFEU


(0.00-0.50) 


 


 





 


Sodium Level


 139 mmol/L


(136-145) 


 


 141 mmol/L


(136-145)


 


Potassium Level


 3.9 mmol/L


(3.5-5.1) 


 


 3.6 mmol/L


(3.5-5.1)


 


Chloride Level


 102 mmol/L


() 


 


 105 mmol/L


()


 


Carbon Dioxide Level


 28 mmol/L


(21-32) 


 


 28 mmol/L


(21-32)


 


Anion Gap 9 (6-14)    8 (6-14) 


 


Blood Urea Nitrogen


 15 mg/dL


(7-20) 


 


 15 mg/dL


(7-20)


 


Creatinine


 1.2 mg/dL


(0.6-1.0) 


 


 1.1 mg/dL


(0.6-1.0)


 


Estimated GFR


(Cockcroft-Gault) 43.9 


 


 


 48.6 





 


BUN/Creatinine Ratio 13 (6-20)    


 


Glucose Level


 105 mg/dL


(70-99) 


 


 101 mg/dL


(70-99)


 


Lactic Acid Level


 1.1 mmol/L


(0.4-2.0) 


 


 





 


Calcium Level


 9.5 mg/dL


(8.5-10.1) 


 


 8.7 mg/dL


(8.5-10.1)


 


Phosphorus Level


 2.9 mg/dL


(2.6-4.7) 


 


 





 


Magnesium Level


 1.8 mg/dL


(1.8-2.4) 


 


 





 


Total Bilirubin


 0.4 mg/dL


(0.2-1.0) 


 


 





 


Aspartate Amino Transf


(AST/SGOT) 12 U/L (15-37) 


 


 


 





 


Alanine Aminotransferase


(ALT/SGPT) 17 U/L (14-59) 


 


 


 





 


Alkaline Phosphatase


 76 U/L


() 


 


 





 


Troponin I High Sensitivity 6 ng/L (4-50)    


 


NT-Pro-B-Type Natriuretic


Peptide 2070 pg/mL


(0-124) 


 


 





 


Total Protein


 8.1 g/dL


(6.4-8.2) 


 


 





 


Albumin


 3.0 g/dL


(3.4-5.0) 


 


 





 


Albumin/Globulin Ratio 0.6 (1.0-1.7)    


 


Lipase


 28 U/L


() 


 


 





 


Coronavirus (COVID-19)(PCR)


 


 Positive (NOT


DETECTD) 


 





 


Influenza Type A Antigen


 


 Negative


(NEGATIVE) 


 





 


Influenza Type B Antigen


 


 Negative


(NEGATIVE) 


 





 


SARS-CoV-2 Antigen (Rapid)


 


 Negative


(NEGATIVE) 


 





 


Urine Collection Type   Unknown  


 


Urine Color   Yellow  


 


Urine Clarity   Clear  


 


Urine pH   5.5 (<5.0-8.0)  


 


Urine Specific Gravity


 


 


 1.025


(1.000-1.030) 





 


Urine Protein


 


 


 Negative mg/dL


(NEG-TRACE) 





 


Urine Glucose (UA)


 


 


 Negative mg/dL


(NEG) 





 


Urine Ketones (Stick)


 


 


 Trace mg/dL


(NEG) 





 


Urine Blood   Negative (NEG)  


 


Urine Nitrite   Negative (NEG)  


 


Urine Bilirubin   Small (NEG)  


 


Urine Urobilinogen Dipstick


 


 


 0.2 mg/dL (0.2


mg/dL) 





 


Urine Leukocyte Esterase   Negative (NEG)  


 


Urine RBC   0 /HPF (0-2)  


 


Urine WBC   1-4 /HPF (0-4)  


 


Urine Squamous Epithelial


Cells 


 


 Mod /LPF 


 





 


Urine Bacteria


 


 


 Few /HPF


(0-FEW) 





 


Urine Hyaline Casts   Few /HPF  


 


Urine Mucus   Mod /LPF  


 


Test


 3/12/22


07:48 3/12/22


11:27 3/12/22


17:22 3/12/22


19:56


 


Glucose (Fingerstick)


 93 mg/dL


(70-99) 106 mg/dL


(70-99) 79 mg/dL


(70-99) 135 mg/dL


(70-99)


 


Test


 3/13/22


07:38 3/13/22


11:49 


 





 


Glucose (Fingerstick)


 85 mg/dL


(70-99) 104 mg/dL


(70-99) 


 











Laboratory Tests








Test


 3/12/22


17:22 3/12/22


19:56 3/13/22


07:38 3/13/22


11:49


 


Glucose (Fingerstick)


 79 mg/dL


(70-99) 135 mg/dL


(70-99) 85 mg/dL


(70-99) 104 mg/dL


(70-99)








Brief Hospital Course


Ms. Yoon  is a  76  year old female admit from ER with acute right-sided chest 

pain. 


she thought pleurisy because the pain went ot her back,  no palpitations, soem 

shortnes of breath. She was nauseated and vomited last night


Pain 6/10, 


She had recent COVID, was vaccinated and has been weak since,    covid PCR was 

again pos here, 


 admti for  A. fib with RVR,  no prior history,  feels better on cardizem gtt 

this AM





Discharge Information


Condition at Discharge:  Improved


Follow Up:  Weeks


Disposition/Orders:  D/C to Home


Scheduled


Apixaban (Eliquis) 5 Mg Tablet, 5 MG PO BID for atrial fib, #60


   Prescribed by: RORY HOLLAND on 3/13/22 1340


Bupropion Hcl (Wellbutrin Sr) 150 Mg Tablet.er, 1 TAB PO BID, #60 Ref 5 (Rep

orted)


   Entered as Reported by: VINNIE ZAMARRIPA on 1/16/18 1721


   Last Taken: Unknown Dose on 3/11/22 0800     Last Action: Continued on 

3/12/22 1006 by RORY MARSH


Calcium Carbonate (Calcium) 500 Mg Tab.chew, 600 MG PO DAILY for Supplement, 

(Reported)


   Entered as Reported by: NIDIA BARAKAT on 3/12/22 0516


   Last Taken: Unknown Dose on 3/11/22 0800     Last Action: New Order on 

3/12/22 0516 by NIDIA BARAKAT


Diltiazem HCl (Diltiazem 24Hr Cd) 240 Mg Cap.er.24h, 240 MG PO DAILY for atiral 

fib, #30 Ref 1


   Prescribed by: RORY HOLLAND on 3/13/22 1340


Gabapentin (Gabapentin **) 300 Mg Capsule, 300 MG PO TID, (Reported)


   Entered as Reported by: VINNIE ZAMARRIPA on 1/16/18 1721


   Last Taken: Unknown Dose on 3/11/22 2000     Last Action: Continued on 

3/12/22 1006 by RORY MARSH


Hydrochlorothiazide (Hydrochlorothiazide Tablet  **) 25 Mg Tablet, 1 TAB PO 

DAILY, #30 Ref 5 (Reported)


   Entered as Reported by: VINNIE ZAMARRIPA on 1/16/18 1721


   Last Taken: Unknown Dose on 3/11/22 0800     Last Action: HELD on 3/12/22 

1006 by RORY MARSH


Levothyroxine Sodium (Levothyroxine Sodium) 100 Mcg Tablet, 1 TAB PO DAILY for 

hypothyroidism, #30 Ref 5 (Reported)


   Entered as Reported by: NIDIA BARAKAT on 3/12/22 0516


   Last Taken: Unknown Dose on 3/11/22 0700     Last Action: Continued on 

3/12/22 1006 by RORY MARSH


Metformin Hcl (Metformin Hcl) 1,000 Mg Tablet, 1,000 MG PO BIDWMEALS for DM, 

(Reported)


   Metformin 1000mg tab, takes 1/2 to 2 tabs daily 


   Entered as Reported by: ESTELA US on 3/12/22 0520


   Last Action: New Order on 3/12/22 0520 by ESTELA US


Multivitamin (Daily Multiple Vitamin) 1 Each Tablet, 1 EACH PO DAILY, (Reported)


   Entered as Reported by: VINNIE ZAMARRIPA on 1/16/18 1721


   Last Taken: Unknown Dose on 3/11/22 0800     Last Action: Last Taken Edited 

on 3/12/22 0443 by NIDIA BARAKAT


Omeprazole (Omeprazole) 20 Mg Capsule.dr, 1 CAP PO DAILY, #30 Ref 5 (Reported)


   Entered as Reported by: VINNIE ZAMARRIPA on 1/16/18 1721


   Last Taken: Unknown Dose on 3/11/22 0800     Last Action: Last Taken Edited 

on 3/12/22 0443 by NIDIA BARAKAT


Phentermine Hcl (Phentermine Hcl) 37.5 Mg Capsule, 1 CAP PO DAILYWBKFT, #30 Ref 

2 (Reported)


   Entered as Reported by: VINNIE ZAMARRIPA on 1/16/18 1722


Ursodiol (Larry) 250 Mg Tablet, 300 MG PO DAILY for gallstones, (Reported)


   Entered as Reported by: NIDIA BARAKAT on 3/12/22 0516


   Last Taken: Unknown Dose on 3/11/22 0800     Last Action: New Order on 

3/12/22 0516 by NIDIA BARAKAT


Vibegron (Gemtesa) 75 Mg Tablet, 75 MG PO DAILY for overactive bladder, 

(Reported)


   Entered as Reported by: NIDIA BARAKAT on 3/12/22 0516


   Last Taken: Unknown Dose on 3/11/22 0800     Last Action: New Order on 

3/12/22 0516 by NIDIA BARAKAT





Scheduled PRN


Hydrocodone Bit/Acetaminophen (Hydrocodone-Apap 7.5-325  **) 1 Tab Tablet, 1 TAB

PO PRN Q6HRS PRN for PAIN, Ref 0 (Reported)


   Entered as Reported by: NIDIA BARAKAT on 3/12/22 0516


   Last Taken: Unknown Dose on 3/11/22 2000     Last Action: Continued on 

3/12/22 1006 by RORY HOLLAND





Patient Instructions


Patient Instructions


face to facemask eval done


COVID patient


33 min dc plan





Justicifation of Admission Dx:


Justifications for Admission:


Justification of Admission Dx:  Yes











RORY HOLLAND MD                 Mar 13, 2022 13:42

## 2022-03-13 NOTE — PDOC
PROGRESS NOTES


Date of Service


DATE: 3/13/22 


TIME: 13:23





Subjective


Subjective


Patient seen and examined





Objective


Objective





Vital Signs








  Date Time  Temp Pulse Resp B/P (MAP) Pulse Ox O2 Delivery O2 Flow Rate FiO2


 


3/13/22 11:00  95 18 105/48 (67) 99 Room Air  


 


3/13/22 08:00       3.0 


 


3/13/22 07:00 98.0       





 98.0       














Intake and Output 


 


 3/13/22





 07:00


 


Intake Total 660 ml


 


Output Total 150 ml


 


Balance 510 ml


 


 


 


Intake Oral 660 ml


 


Output Urine Total 150 ml


 


# Voids 3


 


# Bowel Movements 1











Physical Exam


Abdomen:  Normal bowel sounds


Heart:  Other (Irregularly irregular rate of 90)


General:  No acute distress


Lungs:  Other (Minimally decreased breath sounds)





Assessment


Assessment


Problems


Medical Problems:


(1) Nausea & vomiting


Status: Acute  





(2) New onset atrial fibrillation


Status: Acute  





1.  Atrial fibrillation.  Apparently new onset.  Rate is controlled on her 

present medications.  We will continue present medications and increase activity

as tolerated.  Echo pending.  On anticoagulation.  





2.  Nausea and vomiting.  Significantly improved.  Testing as above.





3.  Possible diastolic heart failure.  Continue present treatments.  

Echocardiogram pending. 





4.  History of hypertension.  We will continue to monitor.





5.  Diabetes mellitus.  As per the primary service.





Comment


Review of Relevant


I have reviewed the following items jocy (where applicable) has been applied.


Labs





Laboratory Tests








Test


 3/11/22


19:09 3/11/22


20:08 3/11/22


20:36 3/12/22


04:00


 


White Blood Count


 9.6 x10^3/uL


(4.0-11.0) 


 


 7.7 x10^3/uL


(4.0-11.0)


 


Red Blood Count


 4.34 x10^6/uL


(3.50-5.40) 


 


 3.78 x10^6/uL


(3.50-5.40)


 


Hemoglobin


 13.0 g/dL


(12.0-15.5) 


 


 11.5 g/dL


(12.0-15.5)


 


Hematocrit


 40.2 %


(36.0-47.0) 


 


 35.4 %


(36.0-47.0)


 


Mean Corpuscular Volume 93 fL ()    94 fL () 


 


Mean Corpuscular Hemoglobin 30 pg (25-35)    31 pg (25-35) 


 


Mean Corpuscular Hemoglobin


Concent 32 g/dL


(31-37) 


 


 33 g/dL


(31-37)


 


Red Cell Distribution Width


 14.4 %


(11.5-14.5) 


 


 14.0 %


(11.5-14.5)


 


Platelet Count


 348 x10^3/uL


(140-400) 


 


 279 x10^3/uL


(140-400)


 


Neutrophils (%) (Auto) 57 % (31-73)    58 % (31-73) 


 


Lymphocytes (%) (Auto) 32 % (24-48)    30 % (24-48) 


 


Monocytes (%) (Auto) 8 % (0-9)    10 % (0-9) 


 


Eosinophils (%) (Auto) 2 % (0-3)    2 % (0-3) 


 


Basophils (%) (Auto) 1 % (0-3)    0 % (0-3) 


 


Neutrophils # (Auto)


 5.4 x10^3/uL


(1.8-7.7) 


 


 4.4 x10^3/uL


(1.8-7.7)


 


Lymphocytes # (Auto)


 3.1 x10^3/uL


(1.0-4.8) 


 


 2.3 x10^3/uL


(1.0-4.8)


 


Monocytes # (Auto)


 0.8 x10^3/uL


(0.0-1.1) 


 


 0.8 x10^3/uL


(0.0-1.1)


 


Eosinophils # (Auto)


 0.2 x10^3/uL


(0.0-0.7) 


 


 0.1 x10^3/uL


(0.0-0.7)


 


Basophils # (Auto)


 0.1 x10^3/uL


(0.0-0.2) 


 


 0.0 x10^3/uL


(0.0-0.2)


 


Prothrombin Time


 13.0 SEC


(11.7-14.0) 


 


 





 


Prothromb Time International


Ratio 1.0 (0.8-1.1) 


 


 


 





 


D-Dimer (Jennifer)


 0.57 ug/mlFEU


(0.00-0.50) 


 


 





 


Sodium Level


 139 mmol/L


(136-145) 


 


 141 mmol/L


(136-145)


 


Potassium Level


 3.9 mmol/L


(3.5-5.1) 


 


 3.6 mmol/L


(3.5-5.1)


 


Chloride Level


 102 mmol/L


() 


 


 105 mmol/L


()


 


Carbon Dioxide Level


 28 mmol/L


(21-32) 


 


 28 mmol/L


(21-32)


 


Anion Gap 9 (6-14)    8 (6-14) 


 


Blood Urea Nitrogen


 15 mg/dL


(7-20) 


 


 15 mg/dL


(7-20)


 


Creatinine


 1.2 mg/dL


(0.6-1.0) 


 


 1.1 mg/dL


(0.6-1.0)


 


Estimated GFR


(Cockcroft-Gault) 43.9 


 


 


 48.6 





 


BUN/Creatinine Ratio 13 (6-20)    


 


Glucose Level


 105 mg/dL


(70-99) 


 


 101 mg/dL


(70-99)


 


Lactic Acid Level


 1.1 mmol/L


(0.4-2.0) 


 


 





 


Calcium Level


 9.5 mg/dL


(8.5-10.1) 


 


 8.7 mg/dL


(8.5-10.1)


 


Phosphorus Level


 2.9 mg/dL


(2.6-4.7) 


 


 





 


Magnesium Level


 1.8 mg/dL


(1.8-2.4) 


 


 





 


Total Bilirubin


 0.4 mg/dL


(0.2-1.0) 


 


 





 


Aspartate Amino Transf


(AST/SGOT) 12 U/L (15-37) 


 


 


 





 


Alanine Aminotransferase


(ALT/SGPT) 17 U/L (14-59) 


 


 


 





 


Alkaline Phosphatase


 76 U/L


() 


 


 





 


Troponin I High Sensitivity 6 ng/L (4-50)    


 


NT-Pro-B-Type Natriuretic


Peptide 2070 pg/mL


(0-124) 


 


 





 


Total Protein


 8.1 g/dL


(6.4-8.2) 


 


 





 


Albumin


 3.0 g/dL


(3.4-5.0) 


 


 





 


Albumin/Globulin Ratio 0.6 (1.0-1.7)    


 


Lipase


 28 U/L


() 


 


 





 


Coronavirus (COVID-19)(PCR)


 


 Positive (NOT


DETECTD) 


 





 


Influenza Type A Antigen


 


 Negative


(NEGATIVE) 


 





 


Influenza Type B Antigen


 


 Negative


(NEGATIVE) 


 





 


SARS-CoV-2 Antigen (Rapid)


 


 Negative


(NEGATIVE) 


 





 


Urine Collection Type   Unknown  


 


Urine Color   Yellow  


 


Urine Clarity   Clear  


 


Urine pH   5.5 (<5.0-8.0)  


 


Urine Specific Gravity


 


 


 1.025


(1.000-1.030) 





 


Urine Protein


 


 


 Negative mg/dL


(NEG-TRACE) 





 


Urine Glucose (UA)


 


 


 Negative mg/dL


(NEG) 





 


Urine Ketones (Stick)


 


 


 Trace mg/dL


(NEG) 





 


Urine Blood   Negative (NEG)  


 


Urine Nitrite   Negative (NEG)  


 


Urine Bilirubin   Small (NEG)  


 


Urine Urobilinogen Dipstick


 


 


 0.2 mg/dL (0.2


mg/dL) 





 


Urine Leukocyte Esterase   Negative (NEG)  


 


Urine RBC   0 /HPF (0-2)  


 


Urine WBC   1-4 /HPF (0-4)  


 


Urine Squamous Epithelial


Cells 


 


 Mod /LPF 


 





 


Urine Bacteria


 


 


 Few /HPF


(0-FEW) 





 


Urine Hyaline Casts   Few /HPF  


 


Urine Mucus   Mod /LPF  


 


Test


 3/12/22


07:48 3/12/22


11:27 3/12/22


17:22 3/12/22


19:56


 


Glucose (Fingerstick)


 93 mg/dL


(70-99) 106 mg/dL


(70-99) 79 mg/dL


(70-99) 135 mg/dL


(70-99)


 


Test


 3/13/22


07:38 3/13/22


11:49 


 





 


Glucose (Fingerstick)


 85 mg/dL


(70-99) 104 mg/dL


(70-99) 


 











Laboratory Tests








Test


 3/12/22


17:22 3/12/22


19:56 3/13/22


07:38 3/13/22


11:49


 


Glucose (Fingerstick)


 79 mg/dL


(70-99) 135 mg/dL


(70-99) 85 mg/dL


(70-99) 104 mg/dL


(70-99)








Microbiology


3/11/22 Blood Culture - Preliminary, Resulted


          NO GROWTH AFTER 1 DAY


Medications





Current Medications


Sodium Chloride 1,000 ml @  1,000 mls/hr 1X  ONCE IV  Last administered on 

3/11/22at 19:59;  Start 3/11/22 at 19:30;  Stop 3/11/22 at 20:29;  Status DC


Ondansetron HCl (Zofran) 4 mg 1X  ONCE IVP  Last administered on 3/11/22at 

19:58;  Start 3/11/22 at 19:30;  Stop 3/11/22 at 19:37;  Status DC


Iohexol (Omnipaque 350 Mg/ml) 80 ml 1X  ONCE IV  Last administered on 3/11/22at 

21:22;  Start 3/11/22 at 21:30;  Stop 3/11/22 at 21:31;  Status DC


Info (CONTRAST GIVEN -- Rx MONITORING) 1 each PRN DAILY  PRN MC SEE COMMENTS;  

Start 3/11/22 at 21:15;  Stop 3/13/22 at 21:14


Diltiazem HCl (Cardizem Iv Push) 10 mg 1X  ONCE IVP  Last administered on 

3/11/22at 21:59;  Start 3/11/22 at 22:00;  Stop 3/11/22 at 22:01;  Status DC


Diltiazem HCl 125 mg/Sodium Chloride 125 ml @ 5 mls/hr 1X  ONCE IV ;  Start 

3/11/22 at 22:00;  Stop 3/11/22 at 21:48;  Status DC


Diltiazem HCl 125 mg/Dextrose 125 ml @ 5 mls/hr 1X  ONCE IV  Last administered 

on 3/11/22at 22:00;  Start 3/11/22 at 22:00;  Stop 3/12/22 at 11:26;  Status DC


Ondansetron HCl (Zofran) 4 mg PRN Q8HRS  PRN IVP NAUSEA/VOMITING 1ST CHOICE;  

Start 3/11/22 at 22:15;  Stop 3/12/22 at 22:14;  Status DC


Fentanyl Citrate (Fentanyl 2ml Vial) 50 mcg PRN Q1HR  PRN IVP SEVERE PAIN 7-10; 

Start 3/11/22 at 22:15;  Stop 3/12/22 at 22:14;  Status DC


Acetaminophen (Tylenol) 650 mg PRN Q4HRS  PRN PO FEVER > 100.3'F;  Start 3/11/22

at 22:15;  Stop 3/12/22 at 22:14;  Status DC


Diltiazem HCl 125 mg/Dextrose 125 ml @ 5 mls/hr CONT PRN  PRN IV AFIB Last 

administered on 3/12/22at 04:26;  Start 3/12/22 at 04:30;  Stop 3/12/22 at 

12:19;  Status DC


Bupropion HCl (Wellbutrin Sr) 150 mg BID PO  Last administered on 3/13/22at 

08:22;  Start 3/12/22 at 11:00


Gabapentin (Neurontin) 300 mg TID PO  Last administered on 3/13/22at 08:24;  

Start 3/12/22 at 12:00


Acetaminophen/ Hydrocodone Bitart (Lortab 7.5/325) 1 tab PRN Q6HRS  PRN PO PAIN 

Last administered on 3/12/22at 21:08;  Start 3/12/22 at 10:15


Levothyroxine Sodium (Synthroid) 100 mcg DAILY06 PO  Last administered on 

3/13/22at 05:46;  Start 3/12/22 at 11:00


Diltiazem HCl (Cardizem 24hr Cd) 240 mg DAILY PO  Last administered on 3/13/22at

08:24;  Start 3/12/22 at 12:30


Apixaban (Eliquis) 5 mg BID PO  Last administered on 3/13/22at 08:24;  Start 

3/12/22 at 14:30





Active Scripts


Active


Reported


Metformin Hcl 1,000 Mg Tablet 1,000 Mg PO BIDWMEALS


     Metformin 1000mg tab, takes 1/2 to 2 tabs daily


Calcium (Calcium Carbonate) 500 Mg Tab.chew 600 Mg PO DAILY


Larry (Ursodiol) 250 Mg Tablet 300 Mg PO DAILY


Levothyroxine Sodium 100 Mcg Tablet 1 Tab PO DAILY


Hydrocodone-Apap 7.5-325  ** (Hydrocodone Bit/Acetaminophen) 1 Tab Tablet 1 Tab 

PO PRN Q6HRS PRN


Gemtesa (Vibegron) 75 Mg Tablet 75 Mg PO DAILY


Phentermine Hcl 37.5 Mg Capsule 1 Cap PO DAILYWBKFT


Omeprazole 20 Mg Capsule.dr 1 Cap PO DAILY


Hydrochlorothiazide Tablet  ** (Hydrochlorothiazide) 25 Mg Tablet 1 Tab PO DAILY


Gabapentin ** (Gabapentin) 300 Mg Capsule 300 Mg PO TID


Daily Multiple Vitamin (Multivitamin) 1 Each Tablet 1 Each PO DAILY


Wellbutrin Sr (Bupropion Hcl) 150 Mg Tablet.er 1 Tab PO BID


Vitals/I & O





Vital Sign - Last 24 Hours








 3/12/22 3/12/22 3/12/22 3/12/22





 15:00 19:00 19:41 21:08


 


Temp 97.4 97.9  





 97.4 97.9  


 


Pulse 92 83  


 


Resp 18 18  


 


B/P (MAP) 119/76 (90) 117/58 (77)  


 


Pulse Ox 96 94  


 


O2 Delivery Room Air Room Air Room Air Room Air


 


    





    





 3/12/22 3/13/22 3/13/22 3/13/22





 23:00 03:00 07:00 08:00


 


Temp 98.5 98.0 98.0 





 98.5 98.0 98.0 


 


Pulse 93 90 90 


 


Resp 20 21 18 


 


B/P (MAP) 101/90 (94) 122/63 (82) 103/57 (72) 


 


Pulse Ox 95 96 93 


 


O2 Delivery Room Air Room Air Room Air Room Air


 


O2 Flow Rate    3.0


 


    





    





 3/13/22 3/13/22  





 08:24 11:00  


 


Pulse 90 95  


 


Resp  18  


 


B/P (MAP) 103/57 105/48 (67)  


 


Pulse Ox  99  


 


O2 Delivery  Room Air  














Intake and Output   


 


 3/12/22 3/12/22 3/13/22





 15:00 23:00 07:00


 


Intake Total 660 ml  


 


Output Total 150 ml  


 


Balance 510 ml  











Justifications for Admission


Other Justification














HARPER TORRES MD            Mar 13, 2022 13:25

## 2022-03-13 NOTE — CARD
MR#: [id number]

Account#: [id number]

Accession#: [id number]

Date of Study: 03/13/2022

Ordering Physician: HARPER LUNSFORD,

Referring Physician: HARPER LUNSFORD,

Aung: Rinku Villarreal [facility]





--------------- APPROVED REPORT --------------





EXAM: Two-dimensional and M-mode echocardiogram with Doppler and color Doppler.



Other Information 

Quality : AverageHR: 88bpm

Rhythm : NSR



INDICATION

Atrial Fibrillation



RISK FACTORS

Hypertension 

Diabetes

COPD



2D DIMENSIONS 

RVDd4.8 (2.9-3.5cm)Left Atrium(2D)4.2 (1.6-4.0cm)

IVSd1.1 (0.7-1.1cm)Aortic Root(2D)3.3 (2.0-3.7cm)

LVDd4.8 (3.9-5.9cm)LVOT Diameter2.2 (1.8-2.4cm)

PWd1.1 (0.7-1.1cm)LA [id number] (18-58mL)

LVDs2.5 (2.5-4.0cm)FS (%) 48.1 %

SV87.0 ml



Aortic Valve

AoV Peak Ron.115.4cm/sAoV VTI21.1cm

AO Peak GR.5.3mmHgLVOT Peak Ron.89.6cm/s

AO Mean GR.3mmHgAVA (VMAX)2.90cm2



Mitral Valve

MV E Peak Gr.8mmHgMV E Mean Gr.3mmHg



Pulmonary Valve

PV Peak Uofjvntb26.8cm/s



Tricuspid Valve

TR P. [id number]/sTR Peak Gr.22mmHg



 LEFT VENTRICLE 

The left ventricle is normal size. There is normal left ventricular wall thickness. The left ventricu
lar systolic function is normal and the ejection fraction is within normal range. LV ejection fractio
n of 55 to 60%. There is normal LV segmental wall motion. No left ventricle thrombus noted on this st
udy. There is no ventricular septal defect visualized. There is no left ventricular aneurysm. There i
s no mass noted in the left ventricle.



 RIGHT VENTRICLE 

The right ventricle is normal size. There is normal right ventricular wall thickness. The right ventr
icular systolic function is normal.



 ATRIA 

The left atrium is mildly dilated. The right atrium is borderline dilated. The interatrial septum is 
intact with no evidence for an atrial septal defect or patent foramen ovale as noted on 2-D or Dopple
r imaging.



 AORTIC VALVE 

The aortic valve is normal in structure and function. Doppler and Color Flow revealed no significant 
aortic regurgitation. There is no significant aortic valvular stenosis. There is no aortic valvular v
egetation.



 MITRAL VALVE 

The mitral valve is normal in structure and function. There is no evidence of mitral valve prolapse. 
There is no mitral valve stenosis. Doppler and Color-flow revealed trace to mild mitral regurgitation
.



 TRICUSPID VALVE 

The tricuspid valve is normal in structure and function. Doppler and Color Flow revealed trace to mil
d tricuspid regurgitation. There is no tricuspid valve prolapse or vegetation. There is no tricuspid 
valve stenosis.



 PULMONIC VALVE 

The pulmonary valve is normal in structure and function. Doppler and Color Flow revealed no pulmonic 
valvular regurgitation. There is no pulmonic valvular stenosis.



 GREAT VESSELS 

The aortic root is normal in size. The ascending aorta is normal in size. The pulmonary artery is nor
mal. The IVC is normal in size and collapses >50% with inspiration.



 PERICARDIAL EFFUSION 

There is no pleural effusion. There is no evidence of significant pericardial effusion.



Critical Notification

Critical Value: No



<Conclusion>

The left ventricle is normal size.

The left ventricular systolic function is normal and the ejection fraction is within normal range.

LV ejection fraction of 55 to 60%.

There is normal LV segmental wall motion.

Doppler and Color Flow revealed no significant aortic regurgitation.

There is no significant aortic valvular stenosis.

Doppler and Color-flow revealed trace to mild mitral regurgitation.

Doppler and Color Flow revealed trace to mild tricuspid regurgitation.



Signed by : Harper Lunsford MD

Electronically Approved : 03/13/2022 16:38:10

## 2022-04-19 ENCOUNTER — HOSPITAL ENCOUNTER (OUTPATIENT)
Dept: HOSPITAL 61 - ONCLAB | Age: 77
End: 2022-04-19
Attending: INTERNAL MEDICINE
Payer: MEDICARE

## 2022-04-19 DIAGNOSIS — C15.5: Primary | ICD-10-CM

## 2022-04-19 LAB
ALBUMIN SERPL-MCNC: 3 G/DL (ref 3.4–5)
ALBUMIN/GLOB SERPL: 0.6 {RATIO} (ref 1–1.7)
ALP SERPL-CCNC: 87 U/L (ref 46–116)
ALT SERPL-CCNC: 17 U/L (ref 14–59)
ANION GAP SERPL CALC-SCNC: 8 MMOL/L (ref 6–14)
AST SERPL-CCNC: 14 U/L (ref 15–37)
BASOPHILS # BLD AUTO: 0.1 X10^3/UL (ref 0–0.2)
BASOPHILS NFR BLD: 1 % (ref 0–3)
BILIRUB SERPL-MCNC: 0.4 MG/DL (ref 0.2–1)
BUN SERPL-MCNC: 16 MG/DL (ref 7–20)
BUN/CREAT SERPL: 13 (ref 6–20)
CALCIUM SERPL-MCNC: 10 MG/DL (ref 8.5–10.1)
CHLORIDE SERPL-SCNC: 101 MMOL/L (ref 98–107)
CO2 SERPL-SCNC: 29 MMOL/L (ref 21–32)
CREAT SERPL-MCNC: 1.2 MG/DL (ref 0.6–1)
EOSINOPHIL NFR BLD: 0.2 X10^3/UL (ref 0–0.7)
EOSINOPHIL NFR BLD: 2 % (ref 0–3)
ERYTHROCYTE [DISTWIDTH] IN BLOOD BY AUTOMATED COUNT: 14.8 % (ref 11.5–14.5)
GFR SERPLBLD BASED ON 1.73 SQ M-ARVRAT: 43.9 ML/MIN
GLUCOSE SERPL-MCNC: 130 MG/DL (ref 70–99)
HCT VFR BLD CALC: 41.1 % (ref 36–47)
HGB BLD-MCNC: 13.8 G/DL (ref 12–15.5)
LYMPHOCYTES # BLD: 2.5 X10^3/UL (ref 1–4.8)
LYMPHOCYTES NFR BLD AUTO: 24 % (ref 24–48)
MCH RBC QN AUTO: 31 PG (ref 25–35)
MCHC RBC AUTO-ENTMCNC: 34 G/DL (ref 31–37)
MCV RBC AUTO: 92 FL (ref 79–100)
MONO #: 1 X10^3/UL (ref 0–1.1)
MONOCYTES NFR BLD: 9 % (ref 0–9)
NEUT #: 6.8 X10^3/UL (ref 1.8–7.7)
NEUTROPHILS NFR BLD AUTO: 64 % (ref 31–73)
PLATELET # BLD AUTO: 359 X10^3/UL (ref 140–400)
POTASSIUM SERPL-SCNC: 4.3 MMOL/L (ref 3.5–5.1)
PROT SERPL-MCNC: 7.8 G/DL (ref 6.4–8.2)
RBC # BLD AUTO: 4.47 X10^6/UL (ref 3.5–5.4)
SODIUM SERPL-SCNC: 138 MMOL/L (ref 136–145)
WBC # BLD AUTO: 10.7 X10^3/UL (ref 4–11)

## 2022-04-19 PROCEDURE — 36415 COLL VENOUS BLD VENIPUNCTURE: CPT

## 2022-04-19 PROCEDURE — 85025 COMPLETE CBC W/AUTO DIFF WBC: CPT

## 2022-04-19 PROCEDURE — 80053 COMPREHEN METABOLIC PANEL: CPT

## 2022-05-27 ENCOUNTER — HOSPITAL ENCOUNTER (OUTPATIENT)
Dept: HOSPITAL 61 - INTRAD | Age: 77
Discharge: HOME | End: 2022-05-27
Attending: INTERNAL MEDICINE
Payer: COMMERCIAL

## 2022-05-27 VITALS — DIASTOLIC BLOOD PRESSURE: 81 MMHG | SYSTOLIC BLOOD PRESSURE: 153 MMHG

## 2022-05-27 VITALS
DIASTOLIC BLOOD PRESSURE: 68 MMHG | SYSTOLIC BLOOD PRESSURE: 113 MMHG | SYSTOLIC BLOOD PRESSURE: 113 MMHG | DIASTOLIC BLOOD PRESSURE: 68 MMHG

## 2022-05-27 VITALS — SYSTOLIC BLOOD PRESSURE: 155 MMHG | DIASTOLIC BLOOD PRESSURE: 86 MMHG

## 2022-05-27 VITALS — HEIGHT: 63 IN | BODY MASS INDEX: 41.56 KG/M2 | WEIGHT: 234.57 LBS

## 2022-05-27 VITALS — DIASTOLIC BLOOD PRESSURE: 77 MMHG | SYSTOLIC BLOOD PRESSURE: 98 MMHG

## 2022-05-27 VITALS — DIASTOLIC BLOOD PRESSURE: 87 MMHG | SYSTOLIC BLOOD PRESSURE: 175 MMHG

## 2022-05-27 DIAGNOSIS — Z98.890: ICD-10-CM

## 2022-05-27 DIAGNOSIS — J44.9: ICD-10-CM

## 2022-05-27 DIAGNOSIS — Z87.891: ICD-10-CM

## 2022-05-27 DIAGNOSIS — Z90.49: ICD-10-CM

## 2022-05-27 DIAGNOSIS — K21.9: ICD-10-CM

## 2022-05-27 DIAGNOSIS — C15.5: ICD-10-CM

## 2022-05-27 DIAGNOSIS — Z72.89: ICD-10-CM

## 2022-05-27 DIAGNOSIS — E66.9: ICD-10-CM

## 2022-05-27 DIAGNOSIS — Z79.899: ICD-10-CM

## 2022-05-27 DIAGNOSIS — E11.9: ICD-10-CM

## 2022-05-27 DIAGNOSIS — E03.9: ICD-10-CM

## 2022-05-27 DIAGNOSIS — Z98.51: ICD-10-CM

## 2022-05-27 DIAGNOSIS — Z45.2: Primary | ICD-10-CM

## 2022-05-27 DIAGNOSIS — I10: ICD-10-CM

## 2022-05-27 DIAGNOSIS — Z79.84: ICD-10-CM

## 2022-05-27 LAB
BASOPHILS # BLD AUTO: 0.1 X10^3/UL (ref 0–0.2)
BASOPHILS NFR BLD: 1 % (ref 0–3)
EOSINOPHIL NFR BLD: 0.2 X10^3/UL (ref 0–0.7)
EOSINOPHIL NFR BLD: 3 % (ref 0–3)
ERYTHROCYTE [DISTWIDTH] IN BLOOD BY AUTOMATED COUNT: 15.3 % (ref 11.5–14.5)
HCT VFR BLD CALC: 38.9 % (ref 36–47)
HGB BLD-MCNC: 12.9 G/DL (ref 12–15.5)
LYMPHOCYTES # BLD: 2.1 X10^3/UL (ref 1–4.8)
LYMPHOCYTES NFR BLD AUTO: 26 % (ref 24–48)
MCH RBC QN AUTO: 30 PG (ref 25–35)
MCHC RBC AUTO-ENTMCNC: 33 G/DL (ref 31–37)
MCV RBC AUTO: 91 FL (ref 79–100)
MONO #: 0.7 X10^3/UL (ref 0–1.1)
MONOCYTES NFR BLD: 9 % (ref 0–9)
NEUT #: 4.9 X10^3/UL (ref 1.8–7.7)
NEUTROPHILS NFR BLD AUTO: 61 % (ref 31–73)
PLATELET # BLD AUTO: 348 X10^3/UL (ref 140–400)
PROTHROMBIN TIME: 13.2 SEC (ref 11.7–14)
RBC # BLD AUTO: 4.29 X10^6/UL (ref 3.5–5.4)
WBC # BLD AUTO: 8 X10^3/UL (ref 4–11)

## 2022-05-27 PROCEDURE — 85025 COMPLETE CBC W/AUTO DIFF WBC: CPT

## 2022-05-27 PROCEDURE — 99152 MOD SED SAME PHYS/QHP 5/>YRS: CPT

## 2022-05-27 PROCEDURE — 99153 MOD SED SAME PHYS/QHP EA: CPT

## 2022-05-27 PROCEDURE — C1892 INTRO/SHEATH,FIXED,PEEL-AWAY: HCPCS

## 2022-05-27 PROCEDURE — 77001 FLUOROGUIDE FOR VEIN DEVICE: CPT

## 2022-05-27 PROCEDURE — 85610 PROTHROMBIN TIME: CPT

## 2022-05-27 PROCEDURE — 36415 COLL VENOUS BLD VENIPUNCTURE: CPT

## 2022-05-27 PROCEDURE — 36561 INSERT TUNNELED CV CATH: CPT

## 2022-05-27 PROCEDURE — 76937 US GUIDE VASCULAR ACCESS: CPT

## 2022-05-27 NOTE — NUR
Discharge Note:



LUCAS RITCHIE



Discharge instructions and discharge home medications reviewed with Patient and a copy 
given. All questions have been answered and understanding verbalized. 



The following instructions and handouts were given: implanted port, sedation, portacath 
information packet

Dressing to R chest dry and intact

Discontinued lines and drains: Peripheral IV intact.



Patient discharged to Home or Self Care with Family Member via Wheelchair [facility]


-------------------------------------------------------------------------------

Addendum: 05/27/22 at 1213 by JORGITO PADILLA RN

-------------------------------------------------------------------------------

Amended: Links added.

## 2022-05-27 NOTE — RAD
PROCEDURE: Fluoroscopically and ultrasound-guided placement of right internal jugular tunnel central 
venous catheter with port (Bard PowerPort, Groshong tip ).



Clinical Indication:  Esophageal cancer



Discussion:



The risks and benefits of the procedure were discussed with the patient and/or their representative. 
Informed consent was obtained. The patient was brought to the fluoroscopy suite and placed in supine 
position. A time out procedure was performed.



The right neck and chest were prepped and draped using maximum sterile barrier technique including th
e use of: Current guideline approved cutaneous antisepsis, a large sterile sheet to establish a steri
le field. Additionally the  wore a hat, mask, sterile gloves, a sterile gown during the proce
dure as well as practiced acceptable hand hygiene prior to placing the port.



Ultrasound-guided access:  Ultrasound evaluation showed the right jugular vein to be patent and compr
essible. 1 % lidocaine with epinephrine was administered to the skin and subcutaneous tissues overlyi
ng the right neck and chest. Under direct ultrasound guidance a single wall puncture was made followe
d by tract dilation and placement of a sheath. An ultrasound image was saved and sent to PACS. Next, 
an incision was made in an infraclavicular location and a pocket created.  The catheter was tunneled 
 between the pocket and the venotomy site. The catheter was advanced through the peel away sheath,  u
nder fluoroscopic guidance, such that it's tip was in the mid right atrium. The catheter was connecte
d to the port reservoir. The port was accessed and found to flush and aspirate normally. The reservoi
r was then placed into the subcutaneous pocket. The wound was closed in layers using 3  Vicryl and 4-
0 Vicryl suture. Dermabond was applied overlying the wound, and venotomy site. The patient tolerated 
procedure without immediate complication.



Sedation: Conscious sedation was performed for  30 minutes. Sedation was carried out  while the patie
nt was continually monitored by a member of the Radiology nursing staff.  Continual cardiopulmonary m
onitoring was carried out during the procedure.  The patient tolerated the procedure well and there w
ere no immediate complications.



Fluoroscopy time: 0.9 mins

Dose area product 5 Gray centimeter squared



Impression: Successful ultrasound and fluoroscopically guided placement of right internal jugular alfonso
esteban central venous catheter with port (Bard PowerPort, Groshong tip).



Electronically signed by: Dandre Catalan MD (5/27/2022 3:53 PM) [id number]

## 2022-05-31 ENCOUNTER — HOSPITAL ENCOUNTER (OUTPATIENT)
Dept: HOSPITAL 61 - ONCLAB | Age: 77
End: 2022-05-31
Attending: INTERNAL MEDICINE
Payer: MEDICARE

## 2022-05-31 DIAGNOSIS — C15.5: Primary | ICD-10-CM

## 2022-05-31 LAB
ALBUMIN SERPL-MCNC: 2.5 G/DL (ref 3.4–5)
ALBUMIN/GLOB SERPL: 0.5 {RATIO} (ref 1–1.7)
ALP SERPL-CCNC: 87 U/L (ref 46–116)
ALT SERPL-CCNC: 8 U/L (ref 14–59)
ANION GAP SERPL CALC-SCNC: 9 MMOL/L (ref 6–14)
AST SERPL-CCNC: 12 U/L (ref 15–37)
BASOPHILS # BLD AUTO: 0.1 X10^3/UL (ref 0–0.2)
BASOPHILS NFR BLD: 1 % (ref 0–3)
BILIRUB SERPL-MCNC: 0.5 MG/DL (ref 0.2–1)
BUN SERPL-MCNC: 12 MG/DL (ref 7–20)
BUN/CREAT SERPL: 12 (ref 6–20)
CALCIUM SERPL-MCNC: 9.8 MG/DL (ref 8.5–10.1)
CHLORIDE SERPL-SCNC: 103 MMOL/L (ref 98–107)
CO2 SERPL-SCNC: 28 MMOL/L (ref 21–32)
CREAT SERPL-MCNC: 1 MG/DL (ref 0.6–1)
EOSINOPHIL NFR BLD: 0.1 X10^3/UL (ref 0–0.7)
EOSINOPHIL NFR BLD: 1 % (ref 0–3)
ERYTHROCYTE [DISTWIDTH] IN BLOOD BY AUTOMATED COUNT: 15 % (ref 11.5–14.5)
GFR SERPLBLD BASED ON 1.73 SQ M-ARVRAT: 54.2 ML/MIN
GLUCOSE SERPL-MCNC: 103 MG/DL (ref 70–99)
HCT VFR BLD CALC: 36.5 % (ref 36–47)
HGB BLD-MCNC: 12.2 G/DL (ref 12–15.5)
LYMPHOCYTES # BLD: 1.9 X10^3/UL (ref 1–4.8)
LYMPHOCYTES NFR BLD AUTO: 23 % (ref 24–48)
MCH RBC QN AUTO: 31 PG (ref 25–35)
MCHC RBC AUTO-ENTMCNC: 34 G/DL (ref 31–37)
MCV RBC AUTO: 91 FL (ref 79–100)
MONO #: 0.9 X10^3/UL (ref 0–1.1)
MONOCYTES NFR BLD: 11 % (ref 0–9)
NEUT #: 5.2 X10^3/UL (ref 1.8–7.7)
NEUTROPHILS NFR BLD AUTO: 64 % (ref 31–73)
PLATELET # BLD AUTO: 326 X10^3/UL (ref 140–400)
POTASSIUM SERPL-SCNC: 3.5 MMOL/L (ref 3.5–5.1)
PROT SERPL-MCNC: 7.5 G/DL (ref 6.4–8.2)
RBC # BLD AUTO: 4 X10^6/UL (ref 3.5–5.4)
SODIUM SERPL-SCNC: 140 MMOL/L (ref 136–145)
WBC # BLD AUTO: 8.1 X10^3/UL (ref 4–11)

## 2022-05-31 PROCEDURE — 36415 COLL VENOUS BLD VENIPUNCTURE: CPT

## 2022-05-31 PROCEDURE — 80053 COMPREHEN METABOLIC PANEL: CPT

## 2022-05-31 PROCEDURE — 85025 COMPLETE CBC W/AUTO DIFF WBC: CPT
